# Patient Record
Sex: FEMALE | Race: BLACK OR AFRICAN AMERICAN | Employment: UNEMPLOYED | URBAN - METROPOLITAN AREA
[De-identification: names, ages, dates, MRNs, and addresses within clinical notes are randomized per-mention and may not be internally consistent; named-entity substitution may affect disease eponyms.]

---

## 2017-02-01 ENCOUNTER — HOSPITAL ENCOUNTER (EMERGENCY)
Age: 57
Discharge: HOME | End: 2017-02-01
Attending: EMERGENCY MEDICINE
Payer: SELF-PAY

## 2017-02-01 VITALS
HEART RATE: 70 BPM | RESPIRATION RATE: 16 BRPM | WEIGHT: 135 LBS | SYSTOLIC BLOOD PRESSURE: 145 MMHG | DIASTOLIC BLOOD PRESSURE: 84 MMHG | TEMPERATURE: 97.8 F | HEIGHT: 62 IN | OXYGEN SATURATION: 100 % | BODY MASS INDEX: 24.84 KG/M2
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
## 2017-02-01 PROCEDURE — 99283 EMERGENCY DEPT VISIT LOW MDM: CPT | Performed by: PHYSICIAN ASSISTANT

## 2017-02-02 NOTE — ED PROVIDER NOTES
HPI Comments: 81-year-old -American female presents stating that 2 days ago she was driving her car and a bug flew in and her chest wall area. Patient states now she has a red raised welt to the center of her chest that is pruritic. She has a prescription for Hydrocortisone cream from another irritation she had. Denies chills, fever, Nausea or vomiting. Patient is a 49 y.o. female presenting with Insect Bite. The history is provided by the patient. Insect Bite   This is a new problem. The current episode started 2 days ago. The problem occurs constantly. The problem has not changed since onset. Pertinent negatives include no chest pain, no abdominal pain, no headaches and no shortness of breath. Nothing aggravates the symptoms. Nothing relieves the symptoms. She has tried nothing for the symptoms. Past Medical History:   Diagnosis Date    Arthritis     Autoimmune disease ([address]      lupus    [facility] thrombosis) ([facility])      to R LE    Esophageal stricture     Gastroesophageal reflux disease     Hypertension     Other ill-defined conditions(799.89)      lupus       Past Surgical History:   Procedure Laterality Date    Hx gi       esopheagus stretched x2         No family history on file. Social History     Social History    Marital status:      Spouse name: N/A    Number of children: N/A    Years of education: N/A     Occupational History    Not on file. Social History Main Topics    Smoking status: Never Smoker    Smokeless tobacco: Never Used    Alcohol use No    Drug use: No    Sexual activity: Yes     Partners: Male     Birth control/ protection: None     Other Topics Concern    Not on file     Social History Narrative         ALLERGIES: Sulfa (sulfonamide antibiotics)    Review of Systems   Constitutional: Negative for chills and fever. HENT: Negative for sore throat, trouble swallowing and voice change. Respiratory: Negative for shortness of breath. Cardiovascular: Negative for chest pain. Gastrointestinal: Negative for abdominal pain, nausea and vomiting. Skin: Positive for rash. Neurological: Negative for headaches. Vitals:    02/01/17 1848   BP: 145/84   Pulse: 71   Resp: 16   Temp: 97.8 °F (36.6 °C)   SpO2: 99%   Weight: 61.2 kg (135 lb)   Height: 5' 2\" (1.575 m)            Physical Exam   Constitutional: She is oriented to person, place, and time. Vital signs are normal. She appears well-developed and well-nourished. She is active. Non-toxic appearance. She does not appear ill. No distress. Cardiovascular: Normal rate, regular rhythm and normal heart sounds. Exam reveals no gallop and no friction rub. No murmur heard. Pulmonary/Chest: Effort normal and breath sounds normal. No accessory muscle usage. No respiratory distress. She has no decreased breath sounds. She has no wheezes. She has no rhonchi. Neurological: She is alert and oriented to person, place, and time. Skin: Skin is warm and dry. Rash noted. Rash is urticarial. There is erythema. Psychiatric: She has a normal mood and affect. Her speech is normal and behavior is normal.   Nursing note and vitals reviewed. MDM  Number of Diagnoses or Management Options  Insect bite, initial encounter: new and does not require workup  Diagnosis management comments: Patient reassured that this area does not appear to be infected or serious. She is advised to use the hydrocortisone cream that she had 3 previous problem sparingly twice a day. She will also use Benadryl as directed for itching or irritation.     Risk of Complications, Morbidity, and/or Mortality  Presenting problems: minimal  Management options: minimal    Patient Progress  Patient progress: stable    ED Course       Procedures

## 2017-02-02 NOTE — ED NOTES
I have reviewed medications, follow up provider options, and discharge instructions with the patient. The patient verbalized understanding. Copy of discharge information given to patient upon discharge. Patient discharged in no distress. Patient instructed not to drive while under the influence of drowsy drugs. Patient ambulatory to waiting area.  No questions at this time

## 2017-02-02 NOTE — DISCHARGE INSTRUCTIONS
Apply Hydrocortisone Cream to affected area as directed. May use Benadryl Allergy 25 mg. Over the counter. May take (2) tablets every 6-8 hours as needed for itching/irritation. Insect Stings and Bites: Care Instructions  Your Care Instructions  Stings and bites from bees, wasps, ants, and other insects often cause pain, swelling, redness, and itching. In some people, especially children, the redness and swelling may be worse. It may extend several inches beyond the affected area. But in most cases, stings and bites don't cause reactions all over the body. If you have had a reaction to an insect sting or bite, you are at risk for a reaction if you get stung or bitten again. Follow-up care is a key part of your treatment and safety. Be sure to make and go to all appointments, and call your doctor if you are having problems. It's also a good idea to know your test results and keep a list of the medicines you take. How can you care for yourself at home? · Do not scratch or rub the skin where the sting or bite occurred. · Put a cold pack or ice cube on the area. Put a thin cloth between the ice and your skin. For some people, a paste of baking soda mixed with a little water helps relieve pain and decrease the reaction. · Take an over-the-counter antihistamine, such as diphenhydramine (Benadryl) or loratadine (Claritin), to relieve swelling, redness, and itching. Calamine lotion or hydrocortisone cream may also help. Do not give antihistamines to your child unless you have checked with the doctor first.  · Be safe with medicines. If your doctor prescribed medicine for your allergy, take it exactly as prescribed. Call your doctor if you think you are having a problem with your medicine. You will get more details on the specific medicines your doctor prescribes. · Your doctor may prescribe a shot of epinephrine to carry with you in case you have a severe reaction.  Learn how and when to give yourself the shot, and keep it with you at all times. Make sure it has not . · Go to the emergency room anytime you have a severe reaction. Go even if you have given yourself epinephrine and are feeling better. Symptoms can come back. When should you call for help? Call 911 anytime you think you may need emergency care. For example, call if:  · You have symptoms of a severe allergic reaction. These may include:  ¨ Sudden raised, red areas (hives) all over your body. ¨ Swelling of the throat, mouth, lips, or tongue. ¨ Trouble breathing. ¨ Passing out (losing consciousness). Or you may feel very lightheaded or suddenly feel weak, confused, or restless. Call your doctor now or seek immediate medical care if:  · You have symptoms of an allergic reaction not right at the sting or bite, such as:  ¨ A rash or small area of hives (raised, red areas on the skin). ¨ Itching. ¨ Swelling. ¨ Belly pain, nausea, or vomiting. · You have a lot of swelling around the site (such as your entire arm or leg is swollen). · You have signs of infection, such as:  ¨ Increased pain, swelling, redness, or warmth around the sting. ¨ Red streaks leading from the area. ¨ Pus draining from the sting. ¨ A fever. Watch closely for changes in your health, and be sure to contact your doctor if:  · You do not get better as expected. Where can you learn more? Go to http://lico-brandie.info/. Enter P390 in the search box to learn more about \"Insect Stings and Bites: Care Instructions. \"  Current as of: 2016  Content Version: 11.1  © 5613-6631 [employer]. Care instructions adapted under license by [employer] (which disclaims liability or warranty for this information). If you have questions about a medical condition or this instruction, always ask your healthcare professional. [place] [address] any warranty or liability for your use of this information.

## 2017-02-17 ENCOUNTER — HOSPITAL ENCOUNTER (EMERGENCY)
Age: 57
Discharge: HOME | End: 2017-02-17
Attending: EMERGENCY MEDICINE
Payer: SELF-PAY

## 2017-02-17 VITALS
SYSTOLIC BLOOD PRESSURE: 111 MMHG | HEART RATE: 65 BPM | WEIGHT: 135 LBS | BODY MASS INDEX: 24.84 KG/M2 | TEMPERATURE: 98.1 F | DIASTOLIC BLOOD PRESSURE: 70 MMHG | RESPIRATION RATE: 16 BRPM | HEIGHT: 62 IN | OXYGEN SATURATION: 100 %

## 2017-02-17 DIAGNOSIS — T14.8XXA MUSCLE STRAIN: Primary | ICD-10-CM

## 2017-02-17 LAB
BACTERIA URNS QL MICRO: 0 /HPF
CASTS URNS QL MICRO: NORMAL /LPF
CRYSTALS URNS QL MICRO: 0 /LPF
EPI CELLS #/AREA URNS HPF: NORMAL /HPF
MUCOUS THREADS URNS QL MICRO: 0 /LPF
RBC #/AREA URNS HPF: NORMAL /HPF
WBC URNS QL MICRO: NORMAL /HPF

## 2017-02-17 PROCEDURE — 81015 MICROSCOPIC EXAM OF URINE: CPT | Performed by: EMERGENCY MEDICINE

## 2017-02-17 PROCEDURE — 81003 URINALYSIS AUTO W/O SCOPE: CPT | Performed by: NURSE PRACTITIONER

## 2017-02-17 PROCEDURE — 87086 URINE CULTURE/COLONY COUNT: CPT | Performed by: NURSE PRACTITIONER

## 2017-02-17 PROCEDURE — 99284 EMERGENCY DEPT VISIT MOD MDM: CPT | Performed by: NURSE PRACTITIONER

## 2017-02-17 RX ORDER — METHOCARBAMOL 750 MG/1
750 TABLET, FILM COATED ORAL 4 TIMES DAILY
Qty: 12 TAB | Refills: 0 | Status: SHIPPED | OUTPATIENT
Start: 2017-02-17 | End: 2017-02-20

## 2017-02-17 NOTE — ED TRIAGE NOTES
Per patient body aches that started today at 1300 patient denies fever and chills, denies cough, patient states she has no taste, and post nasal drainage.

## 2017-02-17 NOTE — ED PROVIDER NOTES
HPI Comments: Patient states generalized body aches and mid back pain that started today. She states she was at work when symptoms started. She denies urinary problems, cough, congestion, and fever. Patient is a 49 y.o. female presenting with general illness. The history is provided by the patient. Generalized Body Aches   This is a new problem. The current episode started 12 to 24 hours ago. The problem occurs constantly. The problem has been gradually worsening. Pertinent negatives include no chest pain, no abdominal pain, no headaches and no shortness of breath. Nothing aggravates the symptoms. Nothing relieves the symptoms. Past Medical History:   Diagnosis Date    Arthritis     Autoimmune disease ([facility] [address]      lupus    DVT (deep venous thrombosis) ([facility])      to R LE    Esophageal stricture     Gastroesophageal reflux disease     Hypertension     Other ill-defined conditions(799.89)      lupus       Past Surgical History:   Procedure Laterality Date    Hx gi       esopheagus stretched x2         History reviewed. No pertinent family history. Social History     Social History    Marital status:      Spouse name: N/A    Number of children: N/A    Years of education: N/A     Occupational History    Not on file. Social History Main Topics    Smoking status: Never Smoker    Smokeless tobacco: Never Used    Alcohol use No    Drug use: No    Sexual activity: Yes     Partners: Male     Birth control/ protection: None     Other Topics Concern    Not on file     Social History Narrative         ALLERGIES: Sulfa (sulfonamide antibiotics)    Review of Systems   Constitutional: Negative for chills, fatigue and fever. HENT: Negative for congestion. Respiratory: Negative for shortness of breath. Cardiovascular: Negative for chest pain and leg swelling. Gastrointestinal: Negative for abdominal pain. Genitourinary: Negative for difficulty urinating.    Musculoskeletal: Positive for back pain and myalgias. Skin: Negative for color change. Neurological: Negative for dizziness, weakness and headaches. Psychiatric/Behavioral: Negative for confusion. Vitals:    02/17/17 1647   BP: 115/66   Pulse: 82   Resp: 18   Temp: 98.1 °F (36.7 °C)   SpO2: 96%   Weight: 61.2 kg (135 lb)   Height: 5' 2\" (1.575 m)            Physical Exam   Constitutional: She is oriented to person, place, and time. She appears well-developed and well-nourished. No distress. HENT:   Head: Normocephalic and atraumatic. Eyes: Conjunctivae are normal. Pupils are equal, round, and reactive to light. Neck: Normal range of motion. Neck supple. Cardiovascular: Normal rate, regular rhythm, normal heart sounds and intact distal pulses. No murmur heard. Pulmonary/Chest: Effort normal and breath sounds normal. No respiratory distress. She has no wheezes. Abdominal: Soft. Bowel sounds are normal. She exhibits no distension. Musculoskeletal:        Cervical back: Normal.        Thoracic back: She exhibits no swelling, no edema, no pain and normal pulse. Lumbar back: She exhibits tenderness and pain. Back:    Neurological: She is alert and oriented to person, place, and time. Skin: Skin is warm and dry. She is not diaphoretic. Psychiatric: She has a normal mood and affect. Her behavior is normal.   Nursing note and vitals reviewed. 6:39 PM-spoke with lab regarding urine specimen. They have sample. Recent Results (from the past 12 hour(s))   URINE MICROSCOPIC    Collection Time: 02/17/17  6:10 PM   Result Value Ref Range    WBC 0-3 0 /hpf    RBC 3-5 0 /hpf    Epithelial cells 3-5 0 /hpf    Bacteria 0 0 /hpf    Casts 5-10 0 /lpf    Crystals 0 0 /LPF    Mucus 0 0 /lpf       MDM  Number of Diagnoses or Management Options  Muscle strain: new and does not require workup  Diagnosis management comments: Patient with blood in her urine. Urine sent for culture.  Patient denies all urinary problems. She states pain has improved since she has increased her fluid intake while in the department. Prescription for robaxin given for at home use. We discussed to return to ED for urinary retention, pain with urination, urinary frequency, or any other concerns.         Amount and/or Complexity of Data Reviewed  Clinical lab tests: ordered and reviewed    Patient Progress  Patient progress: stable    ED Course       Procedures

## 2017-02-18 NOTE — ED NOTES
I have reviewed medications, follow up provider options, and discharge instructions with the patient. The patient verbalized understanding. Copy of discharge information given to patient upon discharge. Prescription(s) given to patient. Patient discharged in no distress. Patient instructed not to drive while under influence of drowsy drugs. Patient ambulatory to waiting area.  No questions at this time

## 2017-02-20 LAB
BACTERIA SPEC CULT: NORMAL
SERVICE CMNT-IMP: NORMAL

## 2017-03-28 ENCOUNTER — HOSPITAL ENCOUNTER (EMERGENCY)
Age: 57
Discharge: HOME | End: 2017-03-28
Attending: EMERGENCY MEDICINE
Payer: SELF-PAY

## 2017-03-28 VITALS
HEIGHT: 62 IN | RESPIRATION RATE: 18 BRPM | BODY MASS INDEX: 25.21 KG/M2 | DIASTOLIC BLOOD PRESSURE: 74 MMHG | TEMPERATURE: 98 F | OXYGEN SATURATION: 100 % | HEART RATE: 57 BPM | WEIGHT: 137 LBS | SYSTOLIC BLOOD PRESSURE: 129 MMHG

## 2017-03-28 DIAGNOSIS — S99.921A FOOT INJURY, RIGHT, INITIAL ENCOUNTER: Primary | ICD-10-CM

## 2017-03-28 PROCEDURE — 99283 EMERGENCY DEPT VISIT LOW MDM: CPT | Performed by: PHYSICIAN ASSISTANT

## 2017-03-29 NOTE — ED NOTES
I have reviewed discharge instructions with the patient. The patient verbalized understanding regarding discharge instructions. The patient exited the facility in an ambulatory fashion with discharge instructions in hand. The patient was in no acute distress upon exiting this facility.

## 2017-03-29 NOTE — ED PROVIDER NOTES
HPI Comments: 79-year-old -American female presents stating that she got out of her car in her driveway and had sneakers on and stepped on a nail with a flat head that went through the sole of her sneaker but did not puncture her right foot. Patient states the nail did bruise the bottom of her foot however. Patient is a 49 y.o. female presenting with foot injury. The history is provided by the patient. Foot Injury    This is a new problem. The current episode started 3 to 5 hours ago. The problem occurs constantly. The problem has not changed since onset. The pain is present in the right foot. The quality of the pain is described as dull. The pain is at a severity of 2/10. The pain is mild. Pertinent negatives include no numbness, full range of motion, no stiffness and no tingling. Exacerbated by: NOTHING. She has tried nothing for the symptoms. There has been a history of trauma. Past Medical History:   Diagnosis Date    Arthritis     Autoimmune disease ([facility] [address]     lupus    DVT (deep venous thrombosis) ([facility])     to R LE    Esophageal stricture     Gastroesophageal reflux disease     Hypertension     Other ill-defined conditions(799.89)     lupus       Past Surgical History:   Procedure Laterality Date    HX GI      esopheagus stretched x2         History reviewed. No pertinent family history. Social History     Social History    Marital status:      Spouse name: N/A    Number of children: N/A    Years of education: N/A     Occupational History    Not on file. Social History Main Topics    Smoking status: Never Smoker    Smokeless tobacco: Never Used    Alcohol use No    Drug use: No    Sexual activity: Yes     Partners: Male     Birth control/ protection: None     Other Topics Concern    Not on file     Social History Narrative         ALLERGIES: Sulfa (sulfonamide antibiotics)    Review of Systems   Constitutional: Negative for chills and fever.    Musculoskeletal: Negative for arthralgias, gait problem and stiffness. Skin: Positive for wound. Neurological: Negative for tingling and numbness. Vitals:    03/28/17 1937   BP: 151/86   Pulse: 72   Resp: 18   Temp: 97.2 °F (36.2 °C)   SpO2: 100%   Weight: 62.1 kg (137 lb)   Height: 5' 2\" (1.575 m)            Physical Exam   Constitutional: She is oriented to person, place, and time. Vital signs are normal. She appears well-developed and well-nourished. She is active. Non-toxic appearance. She does not appear ill. No distress. Patient is ambulatory without difficulty. Cardiovascular:   Pulses:       Dorsalis pedis pulses are 2+ on the right side        Posterior tibial pulses are 2+ on the right side   Musculoskeletal:        Right foot: There is tenderness. There is normal range of motion, no bony tenderness, no swelling, normal capillary refill, no crepitus and no deformity. Feet:    Neurological: She is alert and oriented to person, place, and time. Skin: Skin is warm and dry. Psychiatric: She has a normal mood and affect. Her behavior is normal.   Nursing note and vitals reviewed. MDM  Number of Diagnoses or Management Options  Foot injury, right, initial encounter: new and does not require workup  Diagnosis management comments: Patient is on Coumadin and I advised her to watch for any expanding ecchymosis. I advised her if this occurs she should return to the ER. She states that she does have an appointment with the [facility] tomorrow.     Risk of Complications, Morbidity, and/or Mortality  Presenting problems: low  Management options: minimal    Patient Progress  Patient progress: stable    ED Course       Procedures

## 2017-03-29 NOTE — DISCHARGE INSTRUCTIONS
Keep right foot elevated with ice packs applied as tolerated. There is no puncture or broken skin but observe this area for increased bruising due to your Coumadin use. May use Tylenol as directed. Bruises: Care Instructions  Your Care Instructions    Bruises occur when small blood vessels under the skin tear or rupture, most often from a twist, bump, or fall. Blood leaks into tissues under the skin and causes a black-and-blue spot that often turns colors, including purplish black, reddish blue, or yellowish green, as the bruise heals. Bruises hurt, but most are not serious and will go away on their own within 2 to 4 weeks. Sometimes, gravity causes them to spread down the body. A leg bruise usually will take longer to heal than a bruise on the face or arms. Follow-up care is a key part of your treatment and safety. Be sure to make and go to all appointments, and call your doctor if you are having problems. Its also a good idea to know your test results and keep a list of the medicines you take. How can you care for yourself at home? · Take pain medicines exactly as directed. ¨ If the doctor gave you a prescription medicine for pain, take it as prescribed. ¨ If you are not taking a prescription pain medicine, ask your doctor if you can take an over-the-counter medicine. · Put ice or a cold pack on the area for 10 to 20 minutes at a time. Put a thin cloth between the ice and your skin. · If you can, prop up the bruised area on pillows as much as possible for the next few days. Try to keep the bruise above the level of your heart. When should you call for help? Call your doctor now or seek immediate medical care if:  · You have signs of infection, such as:  ¨ Increased pain, swelling, warmth, or redness. ¨ Red streaks leading from the bruise. ¨ Pus draining from the bruise. ¨ A fever.   · You have a bruise on your leg and signs of a blood clot, such as:  ¨ Increasing redness and swelling along with warmth, tenderness, and pain in the bruised area. ¨ Pain in your calf, back of the knee, thigh, or groin. ¨ Redness and swelling in your leg or groin. · Your pain gets worse. Watch closely for changes in your health, and be sure to contact your doctor if:  · You do not get better as expected. Where can you learn more? Go to http://lico-brandie.info/. Enter ([phone] in the search box to learn more about \"Bruises: Care Instructions. \"  Current as of: May 27, 2016  Content Version: 11.2  © 1133-1169 [employer]. Care instructions adapted under license by [employer] (which disclaims liability or warranty for this information). If you have questions about a medical condition or this instruction, always ask your healthcare professional. [address] any warranty or liability for your use of this information.

## 2018-03-12 PROCEDURE — 99284 EMERGENCY DEPT VISIT MOD MDM: CPT | Performed by: EMERGENCY MEDICINE

## 2018-03-13 ENCOUNTER — HOSPITAL ENCOUNTER (EMERGENCY)
Age: 58
Discharge: HOME | End: 2018-03-13
Attending: EMERGENCY MEDICINE
Payer: SELF-PAY

## 2018-03-13 ENCOUNTER — APPOINTMENT (OUTPATIENT)
Dept: GENERAL RADIOLOGY | Age: 58
End: 2018-03-13
Attending: EMERGENCY MEDICINE
Payer: SELF-PAY

## 2018-03-13 VITALS
DIASTOLIC BLOOD PRESSURE: 92 MMHG | SYSTOLIC BLOOD PRESSURE: 172 MMHG | HEIGHT: 62 IN | HEART RATE: 66 BPM | BODY MASS INDEX: 25.21 KG/M2 | RESPIRATION RATE: 20 BRPM | OXYGEN SATURATION: 100 % | TEMPERATURE: 98.6 F | WEIGHT: 137 LBS

## 2018-03-13 DIAGNOSIS — R07.89 ATYPICAL CHEST PAIN: Primary | ICD-10-CM

## 2018-03-13 LAB
ALBUMIN SERPL-MCNC: 3.7 G/DL (ref 3.5–5)
ALBUMIN/GLOB SERPL: 0.8 {RATIO} (ref 1.2–3.5)
ALP SERPL-CCNC: 64 U/L (ref 50–136)
ALT SERPL-CCNC: 32 U/L (ref 12–65)
ANION GAP SERPL CALC-SCNC: 11 MMOL/L (ref 7–16)
AST SERPL-CCNC: 41 U/L (ref 15–37)
ATRIAL RATE: 75 BPM
BASOPHILS # BLD: 0 K/UL (ref 0–0.2)
BASOPHILS NFR BLD: 0 % (ref 0–2)
BILIRUB SERPL-MCNC: 0.5 MG/DL (ref 0.2–1.1)
BUN SERPL-MCNC: 11 MG/DL (ref 6–23)
CALCIUM SERPL-MCNC: 8.9 MG/DL (ref 8.3–10.4)
CALCULATED P AXIS, ECG09: 72 DEGREES
CALCULATED R AXIS, ECG10: 75 DEGREES
CALCULATED T AXIS, ECG11: 46 DEGREES
CHLORIDE SERPL-SCNC: 102 MMOL/L (ref 98–107)
CO2 SERPL-SCNC: 23 MMOL/L (ref 21–32)
CREAT SERPL-MCNC: 0.8 MG/DL (ref 0.6–1)
DIAGNOSIS, 93000: NORMAL
DIFFERENTIAL METHOD BLD: NORMAL
EOSINOPHIL # BLD: 0.1 K/UL (ref 0–0.8)
EOSINOPHIL NFR BLD: 1 % (ref 0.5–7.8)
ERYTHROCYTE [DISTWIDTH] IN BLOOD BY AUTOMATED COUNT: 12.4 % (ref 11.9–14.6)
GLOBULIN SER CALC-MCNC: 4.7 G/DL (ref 2.3–3.5)
GLUCOSE SERPL-MCNC: 92 MG/DL (ref 65–100)
HCT VFR BLD AUTO: 39.3 % (ref 35.8–46.3)
HGB BLD-MCNC: 13.4 G/DL (ref 11.7–15.4)
IMM GRANULOCYTES # BLD: 0 K/UL (ref 0–0.5)
IMM GRANULOCYTES NFR BLD AUTO: 0 % (ref 0–5)
LYMPHOCYTES # BLD: 1.8 K/UL (ref 0.5–4.6)
LYMPHOCYTES NFR BLD: 37 % (ref 13–44)
MCH RBC QN AUTO: 30.9 PG (ref 26.1–32.9)
MCHC RBC AUTO-ENTMCNC: 34.1 G/DL (ref 31.4–35)
MCV RBC AUTO: 90.8 FL (ref 79.6–97.8)
MONOCYTES # BLD: 0.5 K/UL (ref 0.1–1.3)
MONOCYTES NFR BLD: 9 % (ref 4–12)
NEUTS SEG # BLD: 2.6 K/UL (ref 1.7–8.2)
NEUTS SEG NFR BLD: 53 % (ref 43–78)
P-R INTERVAL, ECG05: 138 MS
PLATELET # BLD AUTO: 176 K/UL (ref 150–450)
PMV BLD AUTO: 11.3 FL (ref 10.8–14.1)
POTASSIUM SERPL-SCNC: 3.4 MMOL/L (ref 3.5–5.1)
PROT SERPL-MCNC: 8.4 G/DL (ref 6.3–8.2)
Q-T INTERVAL, ECG07: 402 MS
QRS DURATION, ECG06: 80 MS
QTC CALCULATION (BEZET), ECG08: 448 MS
RBC # BLD AUTO: 4.33 M/UL (ref 4.05–5.25)
SODIUM SERPL-SCNC: 136 MMOL/L (ref 136–145)
TROPONIN I BLD-MCNC: 0.01 NG/ML (ref 0.02–0.05)
TROPONIN I SERPL-MCNC: <0.02 NG/ML (ref 0.02–0.05)
VENTRICULAR RATE, ECG03: 75 BPM
WBC # BLD AUTO: 4.9 K/UL (ref 4.3–11.1)

## 2018-03-13 PROCEDURE — 71046 X-RAY EXAM CHEST 2 VIEWS: CPT

## 2018-03-13 PROCEDURE — 85025 COMPLETE CBC W/AUTO DIFF WBC: CPT | Performed by: EMERGENCY MEDICINE

## 2018-03-13 PROCEDURE — 84484 ASSAY OF TROPONIN QUANT: CPT | Performed by: EMERGENCY MEDICINE

## 2018-03-13 PROCEDURE — 80053 COMPREHEN METABOLIC PANEL: CPT | Performed by: EMERGENCY MEDICINE

## 2018-03-13 PROCEDURE — 93005 ELECTROCARDIOGRAM TRACING: CPT | Performed by: EMERGENCY MEDICINE

## 2018-03-13 NOTE — DISCHARGE INSTRUCTIONS
Return with any fevers, vomiting, difficulty breathing, worsening symptoms, or additional concerns. Follow up for your colonoscopy as planned.

## 2018-03-13 NOTE — ED NOTES
I have reviewed discharge instructions with the patient. The patient verbalized understanding. Patient left ED via Discharge Method: ambulatory to Home with self. Opportunity for questions and clarification provided. Patient given 0 scripts. To continue your aftercare when you leave the hospital, you may receive an automated call from our care team to check in on how you are doing. This is a free service and part of our promise to provide the best care and service to meet your aftercare needs.  If you have questions, or wish to unsubscribe from this service please call [phone]. [facility].

## 2018-03-13 NOTE — ED TRIAGE NOTES
Pt arrives to ED with c/o chest pain and sob. States started tonight. States having diarrhea and weakness also. States uses warfarin for a fib. States been off of it for one week due to scheduled colonoscopy.

## 2018-05-06 ENCOUNTER — HOSPITAL ENCOUNTER (EMERGENCY)
Age: 58
Discharge: HOME | End: 2018-05-06
Attending: EMERGENCY MEDICINE
Payer: SELF-PAY

## 2018-05-06 VITALS
TEMPERATURE: 98 F | WEIGHT: 135 LBS | HEART RATE: 61 BPM | OXYGEN SATURATION: 100 % | RESPIRATION RATE: 16 BRPM | BODY MASS INDEX: 24.84 KG/M2 | DIASTOLIC BLOOD PRESSURE: 98 MMHG | HEIGHT: 62 IN | SYSTOLIC BLOOD PRESSURE: 172 MMHG

## 2018-05-06 VITALS
TEMPERATURE: 98.2 F | HEART RATE: 61 BPM | DIASTOLIC BLOOD PRESSURE: 79 MMHG | BODY MASS INDEX: 24.84 KG/M2 | RESPIRATION RATE: 18 BRPM | WEIGHT: 135 LBS | OXYGEN SATURATION: 100 % | SYSTOLIC BLOOD PRESSURE: 143 MMHG | HEIGHT: 62 IN

## 2018-05-06 DIAGNOSIS — R10.84 ABDOMINAL PAIN, GENERALIZED: ICD-10-CM

## 2018-05-06 DIAGNOSIS — M54.2 NECK PAIN: ICD-10-CM
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
## 2018-05-06 DIAGNOSIS — R31.0 GROSS HEMATURIA: Primary | ICD-10-CM

## 2018-05-06 LAB
BACTERIA URNS QL MICRO: 0 /HPF
CASTS URNS QL MICRO: NORMAL /LPF
EPI CELLS #/AREA URNS HPF: NORMAL /HPF
RBC #/AREA URNS HPF: NORMAL /HPF
WBC URNS QL MICRO: NORMAL /HPF

## 2018-05-06 PROCEDURE — 81015 MICROSCOPIC EXAM OF URINE: CPT | Performed by: EMERGENCY MEDICINE

## 2018-05-06 PROCEDURE — 99283 EMERGENCY DEPT VISIT LOW MDM: CPT | Performed by: EMERGENCY MEDICINE

## 2018-05-06 PROCEDURE — 81003 URINALYSIS AUTO W/O SCOPE: CPT | Performed by: EMERGENCY MEDICINE

## 2018-05-06 RX ORDER — CYCLOBENZAPRINE HCL 5 MG
10 TABLET ORAL
Qty: 20 TAB | Refills: 0 | Status: SHIPPED | OUTPATIENT
Start: 2018-05-06 | End: 2019-09-17
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
## 2018-05-06 NOTE — ED NOTES
I have reviewed discharge instructions with the patient. The patient verbalized understanding. Patient left ED via Discharge Method: ambulatory to Home. Opportunity for questions and clarification provided. Patient given 0 scripts. To continue your aftercare when you leave the hospital, you may receive an automated call from our care team to check in on how you are doing. This is a free service and part of our promise to provide the best care and service to meet your aftercare needs.  If you have questions, or wish to unsubscribe from this service please call [phone]. Thank you for Choosing our [facility] Emergency Department.

## 2018-05-06 NOTE — ED NOTES
I have reviewed discharge instructions with the patient. The patient verbalized understanding. Patient left ED via Discharge Method: ambulatory to Home with family. Opportunity for questions and clarification provided. Patient given 1 scripts. To continue your aftercare when you leave the hospital, you may receive an automated call from our care team to check in on how you are doing. This is a free service and part of our promise to provide the best care and service to meet your aftercare needs.  If you have questions, or wish to unsubscribe from this service please call [phone]. Thank you for Choosing our [facility] Emergency Department.

## 2018-05-06 NOTE — DISCHARGE INSTRUCTIONS
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
## 2018-06-15 ENCOUNTER — HOSPITAL ENCOUNTER (EMERGENCY)
Age: 58
Discharge: HOME | End: 2018-06-16
Attending: EMERGENCY MEDICINE
Payer: MEDICAID

## 2018-06-15 DIAGNOSIS — S16.1XXA NECK STRAIN, INITIAL ENCOUNTER: Primary | ICD-10-CM

## 2018-06-15 PROCEDURE — 99283 EMERGENCY DEPT VISIT LOW MDM: CPT | Performed by: EMERGENCY MEDICINE

## 2018-06-15 RX ORDER — METHOCARBAMOL 750 MG/1
750 TABLET, FILM COATED ORAL 4 TIMES DAILY
Qty: 15 TAB | Refills: 0 | Status: SHIPPED | OUTPATIENT
Start: 2018-06-15 | End: 2018-06-19

## 2018-06-16 VITALS
WEIGHT: 132 LBS | RESPIRATION RATE: 18 BRPM | SYSTOLIC BLOOD PRESSURE: 143 MMHG | OXYGEN SATURATION: 100 % | TEMPERATURE: 98.2 F | DIASTOLIC BLOOD PRESSURE: 87 MMHG | HEIGHT: 63 IN | HEART RATE: 60 BPM | BODY MASS INDEX: 23.39 KG/M2

## 2018-06-16 NOTE — ED NOTES
I have reviewed discharge instructions with the patient. The patient verbalized understanding. Patient left ED via Discharge Method: stretcher to Nursing Home with self. Opportunity for questions and clarification provided. Patient given 1 scripts. To continue your aftercare when you leave the hospital, you may receive an automated call from our care team to check in on how you are doing. This is a free service and part of our promise to provide the best care and service to meet your aftercare needs.  If you have questions, or wish to unsubscribe from this service please call [phone]. Thank you for Choosing our [facility] Emergency Department.

## 2018-06-16 NOTE — DISCHARGE INSTRUCTIONS
Neck Strain: Care Instructions  Your Care Instructions    You have strained the muscles and ligaments in your neck. A sudden, awkward movement can strain the neck. This often occurs with falls or car accidents or during certain sports. Everyday activities like working on a computer or sleeping can also cause neck strain if they force you to hold your neck in an awkward position for a long time. It is common for neck pain to get worse for a day or two after an injury, but it should start to feel better after that. You may have more pain and stiffness for several days before it gets better. This is expected. It may take a few weeks or longer for it to heal completely. Good home treatment can help you get better faster and avoid future neck problems. Follow-up care is a key part of your treatment and safety. Be sure to make and go to all appointments, and call your doctor if you are having problems. It's also a good idea to know your test results and keep a list of the medicines you take. How can you care for yourself at home? · If you were given a neck brace (cervical collar) to limit neck motion, wear it as instructed for as many days as your doctor tells you to. Do not wear it longer than you were told to. Wearing a brace for too long can make neck stiffness worse and weaken the neck muscles. · You can try using heat or ice to see if it helps. ¨ Try using a heating pad on a low or medium setting for 15 to 20 minutes every 2 to 3 hours. Try a warm shower in place of one session with the heating pad. You can also buy single-use heat wraps that last up to 8 hours. ¨ You can also try an ice pack for 10 to 15 minutes every 2 to 3 hours. · Take pain medicines exactly as directed. ¨ If the doctor gave you a prescription medicine for pain, take it as prescribed. ¨ If you are not taking a prescription pain medicine, ask your doctor if you can take an over-the-counter medicine.   · Gently rub the area to relieve pain and help with blood flow. Do not massage the area if it hurts to do so. · Do not do anything that makes the pain worse. Take it easy for a couple of days. You can do your usual activities if they do not hurt your neck or put it at risk for more stress or injury. · Try sleeping on a special neck pillow. Place it under your neck, not under your head. Placing a tightly rolled-up towel under your neck while you sleep will also work. If you use a neck pillow or rolled towel, do not use your regular pillow at the same time. · To prevent future neck pain, do exercises to stretch and strengthen your neck and back. Learn how to use good posture, safe lifting techniques, and proper body mechanics. When should you call for help? Call 911 anytime you think you may need emergency care. For example, call if:  ? · You are unable to move an arm or a leg at all. ?Call your doctor now or seek immediate medical care if:  ? · You have new or worse symptoms in your arms, legs, chest, belly, or buttocks. Symptoms may include:  ¨ Numbness or tingling. ¨ Weakness. ¨ Pain. ? · You lose bladder or bowel control. ? Watch closely for changes in your health, and be sure to contact your doctor if:  ? · You are not getting better as expected. Where can you learn more? Go to http://lico-brandie.info/. Enter M253 in the search box to learn more about \"Neck Strain: Care Instructions. \"  Current as of: March 21, 2017  Content Version: 11.4  © 7639-4876 [employer]. Care instructions adapted under license by [employer] (which disclaims liability or warranty for this information). If you have questions about a medical condition or this instruction, always ask your healthcare professional. [address] any warranty or liability for your use of this information.

## 2018-06-16 NOTE — ED TRIAGE NOTES
C/o right sided neck pain radiating into right shoulder. Onset this am upon waking. States works at [employer] heavy objects. Denies known injury or trauma.  Attempted tylenol with some relief

## 2018-12-10 ENCOUNTER — APPOINTMENT (OUTPATIENT)
Dept: GENERAL RADIOLOGY | Age: 58
End: 2018-12-10
Attending: EMERGENCY MEDICINE
Payer: SELF-PAY

## 2018-12-10 ENCOUNTER — HOSPITAL ENCOUNTER (EMERGENCY)
Age: 58
Discharge: HOME | End: 2018-12-10
Attending: EMERGENCY MEDICINE
Payer: SELF-PAY

## 2018-12-10 VITALS
HEART RATE: 94 BPM | RESPIRATION RATE: 20 BRPM | SYSTOLIC BLOOD PRESSURE: 135 MMHG | TEMPERATURE: 98.4 F | HEIGHT: 63 IN | BODY MASS INDEX: 22.32 KG/M2 | OXYGEN SATURATION: 98 % | WEIGHT: 126 LBS | DIASTOLIC BLOOD PRESSURE: 78 MMHG

## 2018-12-10 DIAGNOSIS — J06.9 VIRAL UPPER RESPIRATORY INFECTION: Primary | ICD-10-CM

## 2018-12-10 PROCEDURE — 99283 EMERGENCY DEPT VISIT LOW MDM: CPT | Performed by: EMERGENCY MEDICINE

## 2018-12-10 PROCEDURE — 71046 X-RAY EXAM CHEST 2 VIEWS: CPT

## 2018-12-10 RX ORDER — ALBUTEROL SULFATE 90 UG/1
2 AEROSOL, METERED RESPIRATORY (INHALATION)
Qty: 1 INHALER | Refills: 0 | Status: SHIPPED | OUTPATIENT
Start: 2018-12-10

## 2018-12-10 RX ORDER — BENZONATATE 100 MG/1
100 CAPSULE ORAL
Qty: 30 CAP | Refills: 0 | Status: SHIPPED | OUTPATIENT
Start: 2018-12-10 | End: 2018-12-17

## 2018-12-10 RX ORDER — DEXAMETHASONE 4 MG/1
TABLET ORAL
Qty: 9 TAB | Refills: 0 | Status: SHIPPED | OUTPATIENT
Start: 2018-12-10

## 2018-12-10 NOTE — ED PROVIDER NOTES
Very pleasant 61-year-old female presented for cough. Symptoms started yesterday. She described it as ear and nasal congestion that progressed  To a tingling in the back of her throat and now feels like it is deeper into her chest.  She coughs and gets some sputum but mostly is dry. Skin worse at nighttime. She been taking over-the-counter cold and flu medications with minimal relief. She denies fevers or chills. She is not short of breath. She's had no nausea or vomiting. No recent sick contacts that she is aware of. She denies prior diagnoses of pneumonia. No new medications. Cough Past Medical History:  
Diagnosis Date  Arthritis  Autoimmune disease ([address] lupus  DVT (deep venous thrombosis) (HCC)   
 to R LE  
 Esophageal stricture  Gastroesophageal reflux disease  Hypertension  Other ill-defined conditions(799.89)   
 lupus Past Surgical History:  
Procedure Laterality Date  HX GI    
 esopheagus stretched x2 History reviewed. No pertinent family history. Social History Socioeconomic History  Marital status:  Spouse name: Not on file  Number of children: Not on file  Years of education: Not on file  Highest education level: Not on file Social Needs  Financial resource strain: Not on file  Food insecurity - worry: Not on file  Food insecurity - inability: Not on file  Transportation needs - medical: Not on file  Transportation needs - non-medical: Not on file Occupational History  Not on file Tobacco Use  Smoking status: Never Smoker  Smokeless tobacco: Never Used Substance and Sexual Activity  Alcohol use: No  
 Drug use: No  
 Sexual activity: Yes  
  Partners: Male Birth control/protection: None Other Topics Concern  Not on file Social History Narrative  Not on file ALLERGIES: Sulfa (sulfonamide antibiotics) Review of Systems Respiratory: Positive for cough. All other systems reviewed and are negative. Vitals:  
 12/10/18 2049 BP: 145/90 Pulse: 88 Resp: 17 Temp: 98.4 °F (36.9 °C) SpO2: 99% Weight: 57.2 kg (126 lb) Height: 5' 3\" (1.6 m) Physical Exam  
Constitutional: She is oriented to person, place, and time. She appears well-developed and well-nourished. HENT:  
Head: Normocephalic and atraumatic. Mouth/Throat: Oropharynx is clear and moist.  
ild postnasal drip Eyes: Conjunctivae are normal. Pupils are equal, round, and reactive to light. Neck: Neck supple. Cardiovascular: Normal rate and regular rhythm. Pulmonary/Chest: Effort normal and breath sounds normal.  
Abdominal: Soft. Bowel sounds are normal.  
Musculoskeletal: Normal range of motion. Neurological: She is alert and oriented to person, place, and time. Skin: Skin is warm and dry. Nursing note and vitals reviewed. MDM Number of Diagnoses or Management Options Diagnosis management comments: Pleasant 27-year-old presenting for upper respiratory infection and bronchitis type symptoms. Chest x-ray was performed from triage and is clear. Patient's vital signs are normal and I see no indication for antibiotics. Amount and/or Complexity of Data Reviewed Independent visualization of images, tracings, or specimens: yes (I see no acute process) Risk of Complications, Morbidity, and/or Mortality Presenting problems: low Diagnostic procedures: low Management options: low General comments: Viral URI, symptomatic treatment. Patient Progress Patient progress: stable Procedures

## 2018-12-10 NOTE — LETTER
[facility] EMERGENCY DEPT One [address] [address] 
[phone] Work/School Note Date: 12/10/2018 To Whom It May concern: 
 
Waldemar Sabillon was seen and treated today in the emergency room by the following provider(s): 
Attending Provider: Mehdi Kimble MD. Waldemar Sabillon may return to work on 12/12/2018. Sincerely, 
 
 
 
 
Bryce Garcia

## 2018-12-10 NOTE — ED NOTES
I have reviewed discharge instructions with the patient. The patient verbalized understanding. Patient left ED via Discharge Method: ambulatory to Home with (insert name of family/friend, self). Opportunity for questions and clarification provided. Patient given 3 scripts. To continue your aftercare when you leave the hospital, you may receive an automated call from our care team to check in on how you are doing. This is a free service and part of our promise to provide the best care and service to meet your aftercare needs.  If you have questions, or wish to unsubscribe from this service please call [phone]. Thank you for Choosing our [facility] Emergency Department.

## 2018-12-10 NOTE — ED TRIAGE NOTES
Pt states she is here for cough and phlegm in her chest.  States she is coughing up light brown mucus. States her ears are itchy and throat is sore.

## 2019-03-11 ENCOUNTER — HOSPITAL ENCOUNTER (EMERGENCY)
Age: 59
Discharge: HOME | End: 2019-03-11
Attending: EMERGENCY MEDICINE
Payer: SELF-PAY

## 2019-03-11 ENCOUNTER — APPOINTMENT (OUTPATIENT)
Dept: GENERAL RADIOLOGY | Age: 59
End: 2019-03-11
Attending: EMERGENCY MEDICINE
Payer: SELF-PAY

## 2019-03-11 VITALS
RESPIRATION RATE: 18 BRPM | BODY MASS INDEX: 22.32 KG/M2 | HEART RATE: 74 BPM | SYSTOLIC BLOOD PRESSURE: 157 MMHG | DIASTOLIC BLOOD PRESSURE: 87 MMHG | WEIGHT: 126 LBS | TEMPERATURE: 98.3 F | OXYGEN SATURATION: 98 %

## 2019-03-11 DIAGNOSIS — S40.021A TRAUMATIC HEMATOMA OF RIGHT UPPER ARM, INITIAL ENCOUNTER: Primary | ICD-10-CM

## 2019-03-11 LAB
INR BLD: 2.3 (ref 0.9–1.2)
PT BLD: 26.7 SECS (ref 9.6–11.6)

## 2019-03-11 PROCEDURE — 85610 PROTHROMBIN TIME: CPT

## 2019-03-11 PROCEDURE — 99283 EMERGENCY DEPT VISIT LOW MDM: CPT | Performed by: EMERGENCY MEDICINE

## 2019-03-11 PROCEDURE — 73060 X-RAY EXAM OF HUMERUS: CPT

## 2019-03-11 NOTE — DISCHARGE INSTRUCTIONS
May try Tylenol for discomfort. Cool compresses to light tomorrow. May change over to heat. Recheck 2 days if not improving or sooner if worse or signs of infection. Patient Education        Hematoma: Care Instructions  Your Care Instructions    A hematoma is a bad bruise. It happens when an injury causes blood to collect and pool under the skin. The pooling blood gives the skin a spongy, rubbery, lumpy feel. A hematoma usually is not a cause for concern. It is not the same thing as a blood clot in a vein, and it does not cause blood clots. Follow-up care is a key part of your treatment and safety. Be sure to make and go to all appointments, and call your doctor if you are having problems. It's also a good idea to know your test results and keep a list of the medicines you take. How can you care for yourself at home? · Rest and protect the bruised area. · Put ice or a cold pack on the area for 10 to 20 minutes at a time. · Prop up the bruised area on a pillow when you ice it or anytime you sit or lie down during the next 3 days. Try to keep it above the level of your heart. This will help reduce swelling. · Wrapping the bruised area with an elastic bandage such as an Ace wrap will help decrease swelling. Don't wrap it too tightly, as this can cause more swelling below the affected area. · Be safe with medicines. Read and follow all instructions on the label. ? If the doctor gave you a prescription medicine for pain, take it as prescribed. ? If you are not taking a prescription pain medicine, ask your doctor if you can take an over-the-counter medicine. · Do not take two or more pain medicines at the same time unless the doctor told you to. Many pain medicines have acetaminophen, which is Tylenol. Too much acetaminophen (Tylenol) can be harmful. When should you call for help?   Call your doctor now or seek immediate medical care if:    · You have signs of skin infection, such as:  ? Increased pain, swelling, warmth, or redness. ? Red streaks leading from the area. ? Pus draining from the area. ? A fever.    Watch closely for changes in your health, and be sure to contact your doctor if:    · The bruise lasts longer than 4 weeks.     · The bruise gets bigger or becomes more painful.     · You do not get better as expected. Where can you learn more? Go to http://lico-brandie.info/. Enter P911 in the search box to learn more about \"Hematoma: Care Instructions. \"  Current as of: September 23, 2018  Content Version: 11.9  © 0891-3956 [employer]. Care instructions adapted under license by [employer] (which disclaims liability or warranty for this information). If you have questions about a medical condition or this instruction, always ask your healthcare professional. [address] any warranty or liability for your use of this information.

## 2019-03-11 NOTE — LETTER
[facility] [place] [address] [id number] [phone] Work/School Note Date: 3/11/2019 To Whom It May concern: 
 
Reuben Martin was seen and treated today in the emergency room by the following provider(s): 
Attending Provider: Jonathan Olivares MD. Reuben Martin may return to work on 3/14. Sincerely, Faby Hooker MD

## 2019-03-11 NOTE — ED TRIAGE NOTES
Pt reports she bumped into something at home about a week ago and she has a knot in her right upper arm.

## 2019-03-11 NOTE — ED PROVIDER NOTES
61-year-old female is on Coumadin because of a history of DVT. Bumped her right arm a few days ago. 2 days ago was using it on for heavy lifting and pulling at work. Increasing pain the last 48 hours. She noticed a lump in her arm tonight. No numbness or weakness. The history is provided by the patient. Arm Pain    This is a new problem. The current episode started 2 days ago. The problem occurs constantly. The problem has been gradually worsening. The pain is present in the right arm. The quality of the pain is described as aching and dull. Pertinent negatives include no numbness, full range of motion and no neck pain. She has tried nothing for the symptoms. There has been a history of trauma. Past Medical History:   Diagnosis Date    Arthritis     Autoimmune disease ([facility] [address]     lupus    DVT (deep venous thrombosis) (HCC)     to R LE    Esophageal stricture     Gastroesophageal reflux disease     Hypertension     Other ill-defined conditions(799.89)     lupus       Past Surgical History:   Procedure Laterality Date    HX GI      esopheagus stretched x2         History reviewed. No pertinent family history.     Social History     Socioeconomic History    Marital status:      Spouse name: Not on file    Number of children: Not on file    Years of education: Not on file    Highest education level: Not on file   Social Needs    Financial resource strain: Not on file    Food insecurity - worry: Not on file    Food insecurity - inability: Not on file    Transportation needs - medical: Not on file   exoro system needs - non-medical: Not on file   Occupational History    Not on file   Tobacco Use    Smoking status: Never Smoker    Smokeless tobacco: Never Used   Substance and Sexual Activity    Alcohol use: No    Drug use: No    Sexual activity: Yes     Partners: Male     Birth control/protection: None   Other Topics Concern    Not on file   Social History Narrative    Not on file         ALLERGIES: Sulfa (sulfonamide antibiotics)    Review of Systems   Constitutional: Negative for chills and fever. Musculoskeletal: Negative for neck pain. Skin: Negative for color change and rash. Neurological: Negative for weakness and numbness. Vitals:    03/11/19 1814   BP: 175/81   Pulse: 86   Resp: 18   Temp: 98 °F (36.7 °C)   SpO2: 99%            Physical Exam   Constitutional: She appears well-developed and well-nourished. No distress. Musculoskeletal:        Right shoulder: Normal.        Right elbow: Normal.       Arms:  Skin: Skin is warm and dry. Nursing note and vitals reviewed. MDM  Number of Diagnoses or Management Options  Diagnosis management comments: Suspect hematoma. Do not believe DVT imaging needed. We'll check point of care INR. Amount and/or Complexity of Data Reviewed  Clinical lab tests: ordered and reviewed  Tests in the radiology section of CPT®: ordered and reviewed  Independent visualization of images, tracings, or specimens: yes    Risk of Complications, Morbidity, and/or Mortality  Presenting problems: moderate  Diagnostic procedures: minimal  Management options: low           Procedures      Xr Humerus Rt    Result Date: 3/11/2019  RIGHT HUMERUS 3 VIEWS HISTORY: Acute moderate right arm pain with palpable knot for one week. No known injury. FINDINGS: A BB was placed over the palpable abnormality. 3 views demonstrate no displaced fracture, malalignment or aggressive osseous lesion. Soft tissues are normal.     IMPRESSION: Unremarkable 3 views right humerus.

## 2019-03-12 NOTE — ED NOTES
I have reviewed discharge instructions with the patient. The patient verbalized understanding. Patient to follow up with PMD as referred and RTED with any changes/concerns. Patient expresses understanding. Patient from ED in NAD, no changes in med regimen.

## 2019-08-12 ENCOUNTER — HOSPITAL ENCOUNTER (EMERGENCY)
Age: 59
Discharge: HOME | End: 2019-08-12
Attending: EMERGENCY MEDICINE
Payer: SELF-PAY

## 2019-08-12 ENCOUNTER — APPOINTMENT (OUTPATIENT)
Dept: GENERAL RADIOLOGY | Age: 59
End: 2019-08-12
Attending: EMERGENCY MEDICINE
Payer: SELF-PAY

## 2019-08-12 VITALS
BODY MASS INDEX: 25.03 KG/M2 | RESPIRATION RATE: 18 BRPM | WEIGHT: 136 LBS | SYSTOLIC BLOOD PRESSURE: 136 MMHG | HEART RATE: 85 BPM | OXYGEN SATURATION: 100 % | DIASTOLIC BLOOD PRESSURE: 76 MMHG | HEIGHT: 62 IN | TEMPERATURE: 98.4 F

## 2019-08-12 DIAGNOSIS — K59.00 CONSTIPATION, UNSPECIFIED CONSTIPATION TYPE: ICD-10-CM

## 2019-08-12 DIAGNOSIS — B37.31 YEAST VAGINITIS: Primary | ICD-10-CM

## 2019-08-12 LAB
ALBUMIN SERPL-MCNC: 3.5 G/DL (ref 3.5–5)
ALBUMIN/GLOB SERPL: 0.8 {RATIO} (ref 1.2–3.5)
ALP SERPL-CCNC: 69 U/L (ref 50–136)
ALT SERPL-CCNC: 23 U/L (ref 12–65)
ANION GAP SERPL CALC-SCNC: 8 MMOL/L (ref 7–16)
AST SERPL-CCNC: 32 U/L (ref 15–37)
BACTERIA URNS QL MICRO: NORMAL /HPF
BASOPHILS # BLD: 0 K/UL (ref 0–0.2)
BASOPHILS NFR BLD: 0 % (ref 0–2)
BILIRUB SERPL-MCNC: 0.3 MG/DL (ref 0.2–1.1)
BUN SERPL-MCNC: 15 MG/DL (ref 6–23)
CALCIUM SERPL-MCNC: 8.9 MG/DL (ref 8.3–10.4)
CASTS URNS QL MICRO: NORMAL /LPF
CHLORIDE SERPL-SCNC: 108 MMOL/L (ref 98–107)
CO2 SERPL-SCNC: 25 MMOL/L (ref 21–32)
CREAT SERPL-MCNC: 1.04 MG/DL (ref 0.6–1)
DIFFERENTIAL METHOD BLD: ABNORMAL
EOSINOPHIL # BLD: 0 K/UL (ref 0–0.8)
EOSINOPHIL NFR BLD: 0 % (ref 0.5–7.8)
EPI CELLS #/AREA URNS HPF: NORMAL /HPF
ERYTHROCYTE [DISTWIDTH] IN BLOOD BY AUTOMATED COUNT: 12.7 % (ref 11.9–14.6)
GLOBULIN SER CALC-MCNC: 4.2 G/DL (ref 2.3–3.5)
GLUCOSE SERPL-MCNC: 102 MG/DL (ref 65–100)
HCG UR QL: NEGATIVE
HCT VFR BLD AUTO: 36.5 % (ref 35.8–46.3)
HGB BLD-MCNC: 11.6 G/DL (ref 11.7–15.4)
IMM GRANULOCYTES # BLD AUTO: 0 K/UL (ref 0–0.5)
IMM GRANULOCYTES NFR BLD AUTO: 0 % (ref 0–5)
LIPASE SERPL-CCNC: 69 U/L (ref 73–393)
LYMPHOCYTES # BLD: 1.4 K/UL (ref 0.5–4.6)
LYMPHOCYTES NFR BLD: 29 % (ref 13–44)
MCH RBC QN AUTO: 30.3 PG (ref 26.1–32.9)
MCHC RBC AUTO-ENTMCNC: 31.8 G/DL (ref 31.4–35)
MCV RBC AUTO: 95.3 FL (ref 79.6–97.8)
MONOCYTES # BLD: 0.4 K/UL (ref 0.1–1.3)
MONOCYTES NFR BLD: 8 % (ref 4–12)
NEUTS SEG # BLD: 3.1 K/UL (ref 1.7–8.2)
NEUTS SEG NFR BLD: 63 % (ref 43–78)
NRBC # BLD: 0 K/UL (ref 0–0.2)
PLATELET # BLD AUTO: 201 K/UL (ref 150–450)
PMV BLD AUTO: 10.6 FL (ref 9.4–12.3)
POTASSIUM SERPL-SCNC: 4.2 MMOL/L (ref 3.5–5.1)
PROT SERPL-MCNC: 7.7 G/DL (ref 6.3–8.2)
RBC # BLD AUTO: 3.83 M/UL (ref 4.05–5.2)
RBC #/AREA URNS HPF: NORMAL /HPF
SERVICE CMNT-IMP: NORMAL
SODIUM SERPL-SCNC: 141 MMOL/L (ref 136–145)
WBC # BLD AUTO: 5 K/UL (ref 4.3–11.1)
WBC URNS QL MICRO: NORMAL /HPF
WET PREP GENITAL: NORMAL
WET PREP GENITAL: NORMAL

## 2019-08-12 PROCEDURE — 81003 URINALYSIS AUTO W/O SCOPE: CPT | Performed by: EMERGENCY MEDICINE

## 2019-08-12 PROCEDURE — 83690 ASSAY OF LIPASE: CPT

## 2019-08-12 PROCEDURE — 87210 SMEAR WET MOUNT SALINE/INK: CPT

## 2019-08-12 PROCEDURE — 81025 URINE PREGNANCY TEST: CPT

## 2019-08-12 PROCEDURE — 99284 EMERGENCY DEPT VISIT MOD MDM: CPT | Performed by: EMERGENCY MEDICINE

## 2019-08-12 PROCEDURE — 87491 CHLMYD TRACH DNA AMP PROBE: CPT

## 2019-08-12 PROCEDURE — 85025 COMPLETE CBC W/AUTO DIFF WBC: CPT

## 2019-08-12 PROCEDURE — 80053 COMPREHEN METABOLIC PANEL: CPT

## 2019-08-12 PROCEDURE — 74018 RADEX ABDOMEN 1 VIEW: CPT

## 2019-08-12 PROCEDURE — 81015 MICROSCOPIC EXAM OF URINE: CPT

## 2019-08-12 RX ORDER — FLUCONAZOLE 150 MG/1
150 TABLET ORAL
Qty: 1 TAB | Refills: 1 | Status: SHIPPED | OUTPATIENT
Start: 2019-08-12 | End: 2019-08-12

## 2019-08-12 NOTE — ED TRIAGE NOTES
Pt states she has had lower abdominal cramps since this morning. Denies n/v/d. No problems eating or drinking. States she is malodorous in vaginal area.

## 2019-08-12 NOTE — ED PROVIDER NOTES
Patient is a 67 yo female who presents with abdominal pain. States pain began this morning and located across her entire lower abdomen. States she is currently going through menopause however no recent periods. States she does have some vaginal \"foul smell\" however denies any discharge. States no pain with urination, no fevers or chills, no nausea or vomiting, no further complaints. Overall patient is very well appearing and in NAD. Patient seen by me briefly in triage to begin workup until further evaluation and management by another provider once a room becomes available. Patient states she has been constipated and have cramping in her lower abdomen and foul-smelling vaginal area. Denies discharge. No dysuria. The history is provided by the patient. Abdominal Pain    This is a new problem. The current episode started more than 2 days ago. The problem occurs constantly. The problem has been gradually worsening. The pain is located in the RLQ and LLQ. The quality of the pain is cramping. The pain is moderate. Associated symptoms include constipation. Pertinent negatives include no fever, no diarrhea, no nausea, no vomiting, no dysuria and no frequency. Nothing worsens the pain. The pain is relieved by nothing. Past Medical History:   Diagnosis Date    Arthritis     Autoimmune disease ([address]     lupus    DVT (deep venous thrombosis) (HCC)     to R LE    Esophageal stricture     Gastroesophageal reflux disease     Hypertension     Other ill-defined conditions(799.89)     lupus       Past Surgical History:   Procedure Laterality Date    HX GI      esopheagus stretched x2         No family history on file.     Social History     Socioeconomic History    Marital status:      Spouse name: Not on file    Number of children: Not on file    Years of education: Not on file    Highest education level: Not on file   Occupational History    Not on file   Social Needs    Financial resource strain: Not on file    Food insecurity:     Worry: Not on file     Inability: Not on file    Transportation needs:     Medical: Not on file     Non-medical: Not on file   Tobacco Use    Smoking status: Never Smoker    Smokeless tobacco: Never Used   Substance and Sexual Activity    Alcohol use: No    Drug use: No    Sexual activity: Yes     Partners: Male     Birth control/protection: None   Lifestyle    Physical activity:     Days per week: Not on file     Minutes per session: Not on file    Stress: Not on file   Relationships    Social connections:     Talks on phone: Not on file     Gets together: Not on file     Attends Latter day service: Not on file     Active member of club or organization: Not on file     Attends meetings of clubs or organizations: Not on file     Relationship status: Not on file    Intimate partner violence:     Fear of current or ex partner: Not on file     Emotionally abused: Not on file     Physically abused: Not on file     Forced sexual activity: Not on file   Other Topics Concern    Not on file   Social History Narrative    Not on file         ALLERGIES: Sulfa (sulfonamide antibiotics)    Review of Systems   Constitutional: Negative for chills and fever. Gastrointestinal: Positive for abdominal pain and constipation. Negative for diarrhea, nausea and vomiting. Genitourinary: Negative for dysuria and frequency. All other systems reviewed and are negative. There were no vitals filed for this visit. Physical Exam   Constitutional: She is oriented to person, place, and time. She appears well-developed and well-nourished. No distress. HENT:   Head: Normocephalic and atraumatic. Eyes: Conjunctivae are normal. Right eye exhibits no discharge. Left eye exhibits no discharge. Neck: Normal range of motion. Neck supple. Cardiovascular: Normal rate and regular rhythm.    Pulmonary/Chest: Effort normal and breath sounds normal. No respiratory distress. Abdominal: Soft. She exhibits no distension. There is no tenderness. Genitourinary: Vaginal discharge (Yeast) found. Musculoskeletal: Normal range of motion. She exhibits no edema. Neurological: She is alert and oriented to person, place, and time. No cranial nerve deficit. Skin: Skin is warm and dry. Capillary refill takes less than 2 seconds. She is not diaphoretic. Psychiatric: She has a normal mood and affect. Her behavior is normal.   Nursing note and vitals reviewed.        MDM  Number of Diagnoses or Management Options  Constipation, unspecified constipation type:   Yeast vaginitis:   Diagnosis management comments: Treat yeast vaginitis discussed with patient [employer]       Amount and/or Complexity of Data Reviewed  Clinical lab tests: ordered and reviewed    Risk of Complications, Morbidity, and/or Mortality  Presenting problems: moderate  Diagnostic procedures: moderate  Management options: moderate    Patient Progress  Patient progress: improved         Procedures

## 2019-08-13 NOTE — ED NOTES
I have reviewed discharge instructions with the patient. The patient verbalized understanding. Patient left ED via Discharge Method: ambulatory to Home with self    Opportunity for questions and clarification provided. Patient given 1 scripts. Pt in no acute distress at time of d/c        To continue your aftercare when you leave the hospital, you may receive an automated call from our care team to check in on how you are doing. This is a free service and part of our promise to provide the best care and service to meet your aftercare needs.  If you have questions, or wish to unsubscribe from this service please call [phone]. Thank you for Choosing our [facility] Emergency Department.

## 2019-08-13 NOTE — DISCHARGE INSTRUCTIONS
Patient Education        Take miralax 1-2x per day for constipation. Constipation: Care Instructions  Your Care Instructions    Constipation means that you have a hard time passing stools (bowel movements). People pass stools from 3 times a day to once every 3 days. What is normal for you may be different. Constipation may occur with pain in the rectum and cramping. The pain may get worse when you try to pass stools. Sometimes there are small amounts of bright red blood on toilet paper or the surface of stools. This is because of enlarged veins near the rectum (hemorrhoids). A few changes in your diet and lifestyle may help you avoid ongoing constipation. Your doctor may also prescribe medicine to help loosen your stool. Some medicines can cause constipation. These include pain medicines and antidepressants. Tell your doctor about all the medicines you take. Your doctor may want to make a medicine change to ease your symptoms. Follow-up care is a key part of your treatment and safety. Be sure to make and go to all appointments, and call your doctor if you are having problems. It's also a good idea to know your test results and keep a list of the medicines you take. How can you care for yourself at home? · Drink plenty of fluids, enough so that your urine is light yellow or clear like water. If you have kidney, heart, or liver disease and have to limit fluids, talk with your doctor before you increase the amount of fluids you drink. · Include high-fiber foods in your diet each day. These include fruits, vegetables, beans, and whole grains. · Get at least 30 minutes of exercise on most days of the week. Walking is a good choice. You also may want to do other activities, such as running, swimming, cycling, or playing tennis or team sports. · Take a fiber supplement, such as Citrucel or Metamucil, every day. Read and follow all instructions on the label. · Schedule time each day for a bowel movement.  A daily routine may help. Take your time having your bowel movement. · Support your feet with a small step stool when you sit on the toilet. This helps flex your hips and places your pelvis in a squatting position. · Your doctor may recommend an over-the-counter laxative to relieve your constipation. Examples are Milk of Magnesia and MiraLax. Read and follow all instructions on the label. Do not use laxatives on a long-term basis. When should you call for help? Call your doctor now or seek immediate medical care if:    · You have new or worse belly pain.     · You have new or worse nausea or vomiting.     · You have blood in your stools.    Watch closely for changes in your health, and be sure to contact your doctor if:    · Your constipation is getting worse.     · You do not get better as expected. Where can you learn more? Go to http://licoLumenisbrandie.info/. Enter 21 [phone] in the search box to learn more about \"Constipation: Care Instructions. \"  Current as of: September 23, 2018  Content Version: 12.1  © 8791-0754 [employer]. Care instructions adapted under license by [employer] (which disclaims liability or warranty for this information). If you have questions about a medical condition or this instruction, always ask your healthcare professional. [address] any warranty or liability for your use of this information. Patient Education        Vaginal Yeast Infection: Care Instructions  Your Care Instructions    A vaginal yeast infection is caused by too many yeast cells in the vagina. This is common in women of all ages. Itching, vaginal discharge and irritation, and other symptoms can bother you. But yeast infections don't often cause other health problems. Some medicines can increase your risk of getting a yeast infection. These include antibiotics, birth control pills, hormones, and steroids.  You may also be more likely to get a yeast infection if you are pregnant, have diabetes, douche, or wear tight clothes. With treatment, most yeast infections get better in 2 to 3 days. Follow-up care is a key part of your treatment and safety. Be sure to make and go to all appointments, and call your doctor if you are having problems. It's also a good idea to know your test results and keep a list of the medicines you take. How can you care for yourself at home? · Take your medicines exactly as prescribed. Call your doctor if you think you are having a problem with your medicine. · Ask your doctor about over-the-counter (OTC) medicines for yeast infections. They may cost less than prescription medicines. If you use an OTC treatment, read and follow all instructions on the label. · Do not use tampons while using a vaginal cream or suppository. The tampons can absorb the medicine. Use pads instead. · Wear loose cotton clothing. Do not wear nylon or other fabric that holds body heat and moisture close to the skin. · Try sleeping without underwear. · Do not scratch. Relieve itching with a cold pack or a cool bath. · Do not wash your vaginal area more than once a day. Use plain water or a mild, unscented soap. Air-dry the vaginal area. · Change out of wet swimsuits after swimming. · Do not have sex until you have finished your treatment. · Do not douche. When should you call for help? Call your doctor now or seek immediate medical care if:    · You have unexpected vaginal bleeding.     · You have new or increased pain in your vagina or pelvis.    Watch closely for changes in your health, and be sure to contact your doctor if:    · You have a fever.     · You are not getting better after 2 days.     · Your symptoms come back after you finish your medicines. Where can you learn more? Go to http://lico-brandie.info/. Enter P245 in the search box to learn more about \"Vaginal Yeast Infection: Care Instructions. \"  Current as of: February 19, 2019  Content Version: 12.1  © 6762-3530 [employer]. Care instructions adapted under license by [employer] (which disclaims liability or warranty for this information). If you have questions about a medical condition or this instruction, always ask your healthcare professional. [address] any warranty or liability for your use of this information.

## 2019-08-14 LAB
C TRACH RRNA SPEC QL NAA+PROBE: NEGATIVE
N GONORRHOEA RRNA SPEC QL NAA+PROBE: NEGATIVE
SPECIMEN SOURCE: NORMAL

## 2019-09-05 ENCOUNTER — HOSPITAL ENCOUNTER (EMERGENCY)
Age: 59
Discharge: HOME | End: 2019-09-05
Attending: EMERGENCY MEDICINE
Payer: SELF-PAY

## 2019-09-05 VITALS
HEART RATE: 60 BPM | BODY MASS INDEX: 25.03 KG/M2 | SYSTOLIC BLOOD PRESSURE: 126 MMHG | WEIGHT: 136 LBS | HEIGHT: 62 IN | OXYGEN SATURATION: 100 % | RESPIRATION RATE: 18 BRPM | DIASTOLIC BLOOD PRESSURE: 74 MMHG | TEMPERATURE: 97.9 F

## 2019-09-05 DIAGNOSIS — R60.0 BILATERAL LOWER EXTREMITY EDEMA: Primary | ICD-10-CM

## 2019-09-05 LAB
ALBUMIN SERPL-MCNC: 3.5 G/DL (ref 3.5–5)
ALBUMIN/GLOB SERPL: 0.8 {RATIO} (ref 1.2–3.5)
ALP SERPL-CCNC: 65 U/L (ref 50–136)
ALT SERPL-CCNC: 31 U/L (ref 12–65)
ANION GAP SERPL CALC-SCNC: 8 MMOL/L (ref 7–16)
AST SERPL-CCNC: 38 U/L (ref 15–37)
BASOPHILS # BLD: 0 K/UL (ref 0–0.2)
BASOPHILS NFR BLD: 1 % (ref 0–2)
BILIRUB SERPL-MCNC: 0.4 MG/DL (ref 0.2–1.1)
BUN SERPL-MCNC: 20 MG/DL (ref 6–23)
CALCIUM SERPL-MCNC: 9 MG/DL (ref 8.3–10.4)
CHLORIDE SERPL-SCNC: 109 MMOL/L (ref 98–107)
CO2 SERPL-SCNC: 25 MMOL/L (ref 21–32)
CREAT SERPL-MCNC: 0.88 MG/DL (ref 0.6–1)
DIFFERENTIAL METHOD BLD: ABNORMAL
EOSINOPHIL # BLD: 0 K/UL (ref 0–0.8)
EOSINOPHIL NFR BLD: 1 % (ref 0.5–7.8)
ERYTHROCYTE [DISTWIDTH] IN BLOOD BY AUTOMATED COUNT: 12.5 % (ref 11.9–14.6)
GLOBULIN SER CALC-MCNC: 4.4 G/DL (ref 2.3–3.5)
GLUCOSE SERPL-MCNC: 81 MG/DL (ref 65–100)
HCT VFR BLD AUTO: 37.2 % (ref 35.8–46.3)
HGB BLD-MCNC: 12.1 G/DL (ref 11.7–15.4)
IMM GRANULOCYTES # BLD AUTO: 0 K/UL (ref 0–0.5)
IMM GRANULOCYTES NFR BLD AUTO: 0 % (ref 0–5)
LYMPHOCYTES # BLD: 1.3 K/UL (ref 0.5–4.6)
LYMPHOCYTES NFR BLD: 29 % (ref 13–44)
MCH RBC QN AUTO: 30.6 PG (ref 26.1–32.9)
MCHC RBC AUTO-ENTMCNC: 32.5 G/DL (ref 31.4–35)
MCV RBC AUTO: 94.2 FL (ref 79.6–97.8)
MONOCYTES # BLD: 0.6 K/UL (ref 0.1–1.3)
MONOCYTES NFR BLD: 14 % (ref 4–12)
NEUTS SEG # BLD: 2.4 K/UL (ref 1.7–8.2)
NEUTS SEG NFR BLD: 55 % (ref 43–78)
NRBC # BLD: 0 K/UL (ref 0–0.2)
PLATELET # BLD AUTO: 197 K/UL (ref 150–450)
PMV BLD AUTO: 10.9 FL (ref 9.4–12.3)
POTASSIUM SERPL-SCNC: 4.3 MMOL/L (ref 3.5–5.1)
PROT SERPL-MCNC: 7.9 G/DL (ref 6.3–8.2)
RBC # BLD AUTO: 3.95 M/UL (ref 4.05–5.2)
SODIUM SERPL-SCNC: 142 MMOL/L (ref 136–145)
WBC # BLD AUTO: 4.4 K/UL (ref 4.3–11.1)

## 2019-09-05 PROCEDURE — 80053 COMPREHEN METABOLIC PANEL: CPT

## 2019-09-05 PROCEDURE — 99283 EMERGENCY DEPT VISIT LOW MDM: CPT | Performed by: EMERGENCY MEDICINE

## 2019-09-05 PROCEDURE — 85025 COMPLETE CBC W/AUTO DIFF WBC: CPT

## 2019-09-05 NOTE — ED TRIAGE NOTES
Pt to ed with c/o bilateral feet swelling for 3 days - pt [facility] and she was not able to be seen and sent to the ed - pt reports that she started a second BP medication 1 month ago - pt reports that her feet are hurting with ambulation - pt denies any n/v/d - pt denies any chest pain or shob

## 2019-09-06 NOTE — ED NOTES
I have reviewed discharge instructions with the patient. The patient verbalized understanding. Patient left ED via Discharge Method: ambulatory to Home with self. Opportunity for questions and clarification provided. Patient given 1 scripts. To continue your aftercare when you leave the hospital, you may receive an automated call from our care team to check in on how you are doing. This is a free service and part of our promise to provide the best care and service to meet your aftercare needs.  If you have questions, or wish to unsubscribe from this service please call [phone]. Thank you for Choosing our [address] [place] [address] Emergency Department.

## 2019-09-06 NOTE — ED PROVIDER NOTES
Mikki Valenzuela is a 66 y.o. female seen on 9/5/2019 at 8:04 PM in the [facility] EMERGENCY DEPT in room ERM/M. Chief Complaint   Patient presents with    Foot Swelling     HPI: 51-year-old female presenting to the emergency department for evaluation of bilateral lower extremity edema. She states her symptoms been present for the last couple of days. She notes that it is worse in the evenings after work and gets better in the mornings. She states that she spends all day standing up and working on her feet. She noticed that she can see her sock line around her ankle when she gets home. She has not had any chest pain or shortness of breath. No cough no history of heart problems, myocardial infarction, or congestive heart failure. She also was recently started on amlodipine for hypertension. She is taking 10 mg. Historian: Patient    REVIEW OF SYSTEMS     Review of Systems   Constitutional: Negative for fever. HENT: Negative. Eyes: Negative. Respiratory: Negative for cough, chest tightness, shortness of breath and wheezing. Cardiovascular: Positive for leg swelling. Negative for chest pain. Gastrointestinal: Negative for abdominal distention, abdominal pain, constipation, diarrhea and vomiting. Endocrine: Negative. Genitourinary: Negative for dysuria, flank pain, frequency and urgency. Neurological: Negative for dizziness, syncope and headaches. Psychiatric/Behavioral: Negative. All other systems reviewed and are negative.       PAST MEDICAL HISTORY     Past Medical History:   Diagnosis Date    Arthritis     Autoimmune disease ([facility] [address]     lupus    DVT (deep venous thrombosis) (H[facility])     to R LE    Esophageal stricture     Gastroesophageal reflux disease     Hypertension     Other ill-defined conditions(799.89)     lupus     Past Surgical History:   Procedure Laterality Date    HX GI      esopheagus stretched x2     Social History     Socioeconomic History    Marital status:      Spouse name: Not on file    Number of children: Not on file    Years of education: Not on file    Highest education level: Not on file   Tobacco Use    Smoking status: Never Smoker    Smokeless tobacco: Never Used   Substance and Sexual Activity    Alcohol use: No    Drug use: No    Sexual activity: Yes     Partners: Male     Birth control/protection: None     Prior to Admission Medications   Prescriptions Last Dose Informant Patient Reported? Taking? KLOR-CON 10 10 mEq tablet  Self Yes No   Sig: Take 20 mEq by mouth daily. Pt takes one tab daily except Wed and . Pt takes 0.5 tab on Sun     albuterol (PROVENTIL HFA, VENTOLIN HFA, PROAIR HFA) 90 mcg/actuation inhaler   No No   Sig: Take 2 Puffs by inhalation every six (6) hours as needed for Wheezing. amLODIPine (NORVASC) 5 mg tablet   Yes No   Sig: Take 5 mg by mouth daily. budesonide (RHINOCORT AQUA) 32 mcg/actuation nasal spray   Yes No   Si Sprays by Both Nostrils route. cloNIDine (CATAPRES) 0.1 mg tablet   Yes No   Sig: Take 0.1 mg by mouth as needed. For her hot flashes   cyclobenzaprine (FLEXERIL) 5 mg tablet   No No   Sig: Take 2 Tabs by mouth three (3) times daily as needed (pain). dexamethasone (DECADRON) 4 mg tablet   No No   Sig: Take 1 tab every 8 hours for the next 3 days. hydroxychloroquine (PLAQUINIL) 200 mg tablet  Self Yes No   Sig: Take 200 mg by mouth two (2) times a day. lisinopril (PRINIVIL, ZESTRIL) 5 mg tablet   Yes No   Sig: Take  by mouth daily. loratadine (CLARITIN) 10 mg tablet   Yes No   Sig: Take 10 mg by mouth daily. omeprazole (PRILOSEC) 40 mg capsule   No No   Sig: Take 1 Cap by mouth daily. traMADol (ULTRAM) 50 mg tablet   No No   Sig: Take 1 Tab by mouth every six (6) hours as needed for Pain. Max Daily Amount: 200 mg.   warfarin (COUMADIN) 5 mg tablet  Self Yes No   Sig: Take 5 mg by mouth daily.  7.5 on Wed, Fri Facility-Administered Medications: None     Allergies   Allergen Reactions    Sulfa (Sulfonamide Antibiotics) Nausea and Vomiting        PHYSICAL EXAM       Vitals:    09/05/19 1819   BP: 137/84   Pulse: 81   Resp: 20   Temp: 98 °F (36.7 °C)   SpO2: 100%    Vital signs were reviewed. Physical Exam   Constitutional: She is oriented to person, place, and time. She appears well-developed and well-nourished. No distress. HENT:   Head: Normocephalic and atraumatic. Eyes: Pupils are equal, round, and reactive to light. EOM are normal.   Neck: Normal range of motion. Neck supple. Cardiovascular: Normal rate, regular rhythm, normal heart sounds and intact distal pulses. Exam reveals no gallop and no friction rub. No murmur heard. Pulmonary/Chest: Effort normal and breath sounds normal. No stridor. No respiratory distress. She has no wheezes. She has no rales. No crackles   Abdominal: Soft. Bowel sounds are normal. She exhibits no distension and no mass. There is no tenderness. There is no rebound and no guarding. Musculoskeletal: Normal range of motion. She exhibits edema (2+ bilateral LE edema). She exhibits no tenderness or deformity. No erythema, no increased warmth   Neurological: She is alert and oriented to person, place, and time. No sensory deficit. No focal neuro deficits   Skin: Skin is warm and dry. No rash noted. She is not diaphoretic. No erythema. Psychiatric: She has a normal mood and affect. Her behavior is normal.   Vitals reviewed. MEDICAL DECISION MAKING     Differential Diagnosis: Medication side effect, dependent edema, CHF, DVT    MDM  Number of Diagnoses or Management Options     Amount and/or Complexity of Data Reviewed  Clinical lab tests: ordered and reviewed      Procedures    ED Course: Symptoms do not appear consistent with DVT. Her lungs are clear, no history of cardiac problems. I suspect this is either dependent edema or related to amlodipine.   I recommend that she contact her primary care doctor's office to discuss changing medications and switching to a different antihypertensive. Also I will write her for some graded compression hose to see if this helps with her swelling. Labs are normal, VSS, will dc home at this time. Disposition: Discharge home  Diagnosis: bilateral lower extremity edema  ____________________________________________________________________  A portion of this note was generated using voice recognition dictation software. While the note has been reviewed for accuracy, please note certain words and phrases may not be transcribed as intended and some grammatical and/or typographical errors may be present.

## 2019-09-17 ENCOUNTER — APPOINTMENT (OUTPATIENT)
Dept: GENERAL RADIOLOGY | Age: 59
End: 2019-09-17
Attending: EMERGENCY MEDICINE
Payer: SELF-PAY

## 2019-09-17 ENCOUNTER — APPOINTMENT (OUTPATIENT)
Dept: CT IMAGING | Age: 59
End: 2019-09-17
Attending: EMERGENCY MEDICINE
Payer: SELF-PAY

## 2019-09-17 ENCOUNTER — HOSPITAL ENCOUNTER (EMERGENCY)
Age: 59
Discharge: HOME | End: 2019-09-17
Attending: EMERGENCY MEDICINE
Payer: SELF-PAY

## 2019-09-17 VITALS
HEART RATE: 51 BPM | TEMPERATURE: 98.9 F | HEIGHT: 62 IN | RESPIRATION RATE: 16 BRPM | WEIGHT: 137 LBS | DIASTOLIC BLOOD PRESSURE: 78 MMHG | BODY MASS INDEX: 25.21 KG/M2 | OXYGEN SATURATION: 100 % | SYSTOLIC BLOOD PRESSURE: 154 MMHG

## 2019-09-17 DIAGNOSIS — M79.18 MUSCULOSKELETAL PAIN: Primary | ICD-10-CM

## 2019-09-17 PROCEDURE — 72125 CT NECK SPINE W/O DYE: CPT

## 2019-09-17 PROCEDURE — 71046 X-RAY EXAM CHEST 2 VIEWS: CPT

## 2019-09-17 PROCEDURE — 99284 EMERGENCY DEPT VISIT MOD MDM: CPT | Performed by: EMERGENCY MEDICINE

## 2019-09-17 PROCEDURE — 72100 X-RAY EXAM L-S SPINE 2/3 VWS: CPT

## 2019-09-17 RX ORDER — CYCLOBENZAPRINE HCL 5 MG
10 TABLET ORAL
Qty: 20 TAB | Refills: 0 | Status: SHIPPED | OUTPATIENT
Start: 2019-09-17

## 2019-09-17 RX ORDER — TRAMADOL HYDROCHLORIDE 50 MG/1
50 TABLET ORAL
Qty: 13 TAB | Refills: 0 | Status: SHIPPED | OUTPATIENT
Start: 2019-09-17 | End: 2019-09-20
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
## 2019-09-17 NOTE — LETTER
[address] [facility] EMERGENCY DEPT 
[address] [facility] [address] 
[phone] Work/School Note Date: 9/17/2019 To Whom It May concern: 
 
Ana Saleem was seen and treated today in the emergency room by the following provider(s): 
Attending Provider: Orin Vanessa MD. Ana Saleem may return to work on 09/19/2019.  
 
Sincerely, 
 
 
 
 
Alexis Gardiner RN

## 2019-09-18 NOTE — ED NOTES
I have reviewed discharge instructions with the patient and family member. The patient and family member verbalized understanding. Patient left ED via Discharge Method: ambulatory to Home with family member    Opportunity for questions and clarification provided. Patient given 2 scripts. To continue your aftercare when you leave the hospital, you may receive an automated call from our care team to check in on how you are doing. This is a free service and part of our promise to provide the best care and service to meet your aftercare needs.  If you have questions, or wish to unsubscribe from this service please call [phone]. Thank you for Choosing our [facility] Emergency Department.

## 2019-11-15 ENCOUNTER — APPOINTMENT (OUTPATIENT)
Dept: GENERAL RADIOLOGY | Age: 59
End: 2019-11-15
Attending: EMERGENCY MEDICINE
Payer: SELF-PAY

## 2019-11-15 ENCOUNTER — HOSPITAL ENCOUNTER (EMERGENCY)
Age: 59
Discharge: HOME | End: 2019-11-15
Attending: EMERGENCY MEDICINE
Payer: SELF-PAY

## 2019-11-15 VITALS
DIASTOLIC BLOOD PRESSURE: 84 MMHG | TEMPERATURE: 98.6 F | OXYGEN SATURATION: 100 % | RESPIRATION RATE: 20 BRPM | HEART RATE: 73 BPM | SYSTOLIC BLOOD PRESSURE: 158 MMHG

## 2019-11-15 DIAGNOSIS — R07.89 ATYPICAL CHEST PAIN: Primary | ICD-10-CM

## 2019-11-15 LAB
ALBUMIN SERPL-MCNC: 3.5 G/DL (ref 3.5–5)
ALBUMIN/GLOB SERPL: 0.7 {RATIO} (ref 1.2–3.5)
ALP SERPL-CCNC: 68 U/L (ref 50–136)
ALT SERPL-CCNC: 29 U/L (ref 12–65)
ANION GAP SERPL CALC-SCNC: 7 MMOL/L (ref 7–16)
AST SERPL-CCNC: 35 U/L (ref 15–37)
BASOPHILS # BLD: 0 K/UL (ref 0–0.2)
BASOPHILS NFR BLD: 1 % (ref 0–2)
BILIRUB SERPL-MCNC: 0.4 MG/DL (ref 0.2–1.1)
BUN SERPL-MCNC: 13 MG/DL (ref 6–23)
CALCIUM SERPL-MCNC: 9.3 MG/DL (ref 8.3–10.4)
CHLORIDE SERPL-SCNC: 107 MMOL/L (ref 98–107)
CO2 SERPL-SCNC: 27 MMOL/L (ref 21–32)
CREAT SERPL-MCNC: 0.95 MG/DL (ref 0.6–1)
DIFFERENTIAL METHOD BLD: ABNORMAL
EOSINOPHIL # BLD: 0 K/UL (ref 0–0.8)
EOSINOPHIL NFR BLD: 0 % (ref 0.5–7.8)
ERYTHROCYTE [DISTWIDTH] IN BLOOD BY AUTOMATED COUNT: 12.3 % (ref 11.9–14.6)
GLOBULIN SER CALC-MCNC: 4.7 G/DL (ref 2.3–3.5)
GLUCOSE SERPL-MCNC: 99 MG/DL (ref 65–100)
HCT VFR BLD AUTO: 38.8 % (ref 35.8–46.3)
HGB BLD-MCNC: 12.5 G/DL (ref 11.7–15.4)
IMM GRANULOCYTES # BLD AUTO: 0 K/UL (ref 0–0.5)
IMM GRANULOCYTES NFR BLD AUTO: 0 % (ref 0–5)
INR PPP: 2.1
LYMPHOCYTES # BLD: 1.4 K/UL (ref 0.5–4.6)
LYMPHOCYTES NFR BLD: 36 % (ref 13–44)
MCH RBC QN AUTO: 29.7 PG (ref 26.1–32.9)
MCHC RBC AUTO-ENTMCNC: 32.2 G/DL (ref 31.4–35)
MCV RBC AUTO: 92.2 FL (ref 79.6–97.8)
MONOCYTES # BLD: 0.4 K/UL (ref 0.1–1.3)
MONOCYTES NFR BLD: 10 % (ref 4–12)
NEUTS SEG # BLD: 2 K/UL (ref 1.7–8.2)
NEUTS SEG NFR BLD: 53 % (ref 43–78)
NRBC # BLD: 0 K/UL (ref 0–0.2)
PLATELET # BLD AUTO: 209 K/UL (ref 150–450)
PMV BLD AUTO: 9.9 FL (ref 9.4–12.3)
POTASSIUM SERPL-SCNC: 4 MMOL/L (ref 3.5–5.1)
PROT SERPL-MCNC: 8.2 G/DL (ref 6.3–8.2)
PROTHROMBIN TIME: 24.2 SEC (ref 11.7–14.5)
RBC # BLD AUTO: 4.21 M/UL (ref 4.05–5.2)
SODIUM SERPL-SCNC: 141 MMOL/L (ref 136–145)
TROPONIN I SERPL-MCNC: <0.02 NG/ML (ref 0.02–0.05)
WBC # BLD AUTO: 3.8 K/UL (ref 4.3–11.1)

## 2019-11-15 PROCEDURE — 71046 X-RAY EXAM CHEST 2 VIEWS: CPT

## 2019-11-15 PROCEDURE — 93005 ELECTROCARDIOGRAM TRACING: CPT | Performed by: EMERGENCY MEDICINE

## 2019-11-15 PROCEDURE — 84484 ASSAY OF TROPONIN QUANT: CPT

## 2019-11-15 PROCEDURE — 85610 PROTHROMBIN TIME: CPT

## 2019-11-15 PROCEDURE — 80053 COMPREHEN METABOLIC PANEL: CPT

## 2019-11-15 PROCEDURE — 99284 EMERGENCY DEPT VISIT MOD MDM: CPT | Performed by: EMERGENCY MEDICINE

## 2019-11-15 PROCEDURE — 85025 COMPLETE CBC W/AUTO DIFF WBC: CPT

## 2019-11-15 NOTE — ED TRIAGE NOTES
Pt ambulatory to triage without complications. Pt reports cough at night sometimes and states she coughed so hard and she felt like something pulled in her chest. Pt reports nasal congestion. Pt states she has been burping a lot. Pt states she has been exercising in the morning for about a year as well and some pain when she is doing that.  Pt is on coumadin, lisinopril, norvasc,

## 2019-11-15 NOTE — ED NOTES
I have reviewed discharge instructions with the patient. The patient verbalized understanding. Patient left ED via Discharge Method: ambulatory to Home with family. Opportunity for questions and clarification provided. Patient given 0 scripts. To continue your aftercare when you leave the hospital, you may receive an automated call from our care team to check in on how you are doing. This is a free service and part of our promise to provide the best care and service to meet your aftercare needs.  If you have questions, or wish to unsubscribe from this service please call [phone]. Thank you for Choosing our [facility] Emergency Department.

## 2019-11-15 NOTE — DISCHARGE INSTRUCTIONS
As we discussed, we did not find the exact cause of your symptoms today in the emergency department. Therefore, it is important for you to follow-up with your primary care doctor for reevaluation. Please return with any fevers, vomiting, difficulty breathing, worsening symptoms, or additional concerns.

## 2019-11-15 NOTE — ED PROVIDER NOTES
Provider in triage note:  70-year-old black female with history of lupus, DVT, hypertension presents the emergency department with a complaint of intermittent cough for several weeks, left-sided chest pain starting 2 days ago after a rather significant amount of coughing which has persisted. She denies any associated shortness of breath diaphoresis nausea or vomiting. She denies missing any of her doses of Coumadin. Chest pain is been constant, dull, under the left breast.  On exam, vital signs are stable, chest pain is not reproducible, though exam is limited secondary to being in triage, lungs are clear to auscultation and heart rate is regular without evidence of murmur or irregular rhythm. There is no evidence of lower extremity edema. Patient be screened with cardiac work-up as well as chest x-ray. She will also be screened with a PT/INR. At this time the patient's discomfort appears more musculoskeletal in nature. Patient has a long-standing history of allergies and thinks her cough is from same. Alonzo Perry MD    Note from Dr. Ramila Cervantes: Agree with above. Additional history: Patient says that her pain has decreased significantly since she has been passing gas. She says that has made a large amount of the pain go away. She denies any other significant symptoms. Elements of this note were created using speech recognition software. As such, errors of speech recognition may be present. Cough   Pertinent negatives include no chest pain, no chills, no eye redness, no headaches, no rhinorrhea, no sore throat, no myalgias, no shortness of breath, no wheezing, no nausea, no vomiting and no confusion.         Past Medical History:   Diagnosis Date    Arthritis     Autoimmune disease ([facility] [address]     lupus    DVT (deep venous thrombosis) ([facility])     to R LE    Esophageal stricture     Gastroesophageal reflux disease     Hypertension     Other ill-defined conditions(259.89)     lupus       Past Surgical History:   Procedure Laterality Date    HX GI      esopheagus stretched x2         No family history on file. Social History     Socioeconomic History    Marital status:      Spouse name: Not on file    Number of children: Not on file    Years of education: Not on file    Highest education level: Not on file   Occupational History    Not on file   Social Needs    Financial resource strain: Not on file    Food insecurity:     Worry: Not on file     Inability: Not on file    Transportation needs:     Medical: Not on file     Non-medical: Not on file   Tobacco Use    Smoking status: Never Smoker    Smokeless tobacco: Never Used   Substance and Sexual Activity    Alcohol use: No    Drug use: No    Sexual activity: Yes     Partners: Male     Birth control/protection: None   Lifestyle    Physical activity:     Days per week: Not on file     Minutes per session: Not on file    Stress: Not on file   Relationships    Social connections:     Talks on phone: Not on file     Gets together: Not on file     Attends Zoroastrian service: Not on file     Active member of club or organization: Not on file     Attends meetings of clubs or organizations: Not on file     Relationship status: Not on file    Intimate partner violence:     Fear of current or ex partner: Not on file     Emotionally abused: Not on file     Physically abused: Not on file     Forced sexual activity: Not on file   Other Topics Concern    Not on file   Social History Narrative    Not on file         ALLERGIES: Sulfa (sulfonamide antibiotics)    Review of Systems   Constitutional: Negative for chills, diaphoresis and fever. HENT: Negative for congestion, rhinorrhea and sore throat. Eyes: Negative for redness and visual disturbance. Respiratory: Positive for cough and chest tightness. Negative for shortness of breath and wheezing. Cardiovascular: Negative for chest pain and palpitations.    Gastrointestinal: Negative for abdominal pain, blood in stool, diarrhea, nausea and vomiting. Endocrine: Negative for polydipsia and polyuria. Genitourinary: Negative for dysuria and hematuria. Musculoskeletal: Negative for arthralgias, myalgias and neck stiffness. Skin: Negative for rash. Allergic/Immunologic: Negative for environmental allergies and food allergies. Neurological: Negative for dizziness, weakness and headaches. Hematological: Negative for adenopathy. Does not bruise/bleed easily. Psychiatric/Behavioral: Negative for confusion and sleep disturbance. The patient is not nervous/anxious. Vitals:    11/15/19 1339   BP: 148/69   Pulse: 93   Resp: 16   Temp: 98.6 °F (37 °C)   SpO2: 96%            Physical Exam   Constitutional: She is oriented to person, place, and time. She appears well-developed and well-nourished. HENT:   Head: Normocephalic and atraumatic. Eyes: Pupils are equal, round, and reactive to light. Conjunctivae and EOM are normal.   Neck: Normal range of motion. Cardiovascular: Normal rate and regular rhythm. Pulmonary/Chest: Effort normal and breath sounds normal. No respiratory distress. She has no wheezes. She has no rales. She exhibits no tenderness. Abdominal: Soft. Bowel sounds are normal. There is no rebound and no guarding. Musculoskeletal: Normal range of motion. She exhibits no edema or tenderness. Lymphadenopathy:     She has no cervical adenopathy. Neurological: She is alert and oriented to person, place, and time. Skin: Skin is warm and dry. Psychiatric: She has a normal mood and affect. Nursing note and vitals reviewed. MDM  Number of Diagnoses or Management Options  Diagnosis management comments: Patient's chest x-ray was negative. Her troponin was negative. Her symptoms seem more GI or perhaps muscle skeletal in nature. I do not think she is having an acute coronary syndrome and I will discharge her home.   Patient's EKG is unremarkable with no acute ischemic changes and a normal sinus rhythm.          Procedures

## 2019-11-16 LAB
ATRIAL RATE: 71 BPM
CALCULATED P AXIS, ECG09: 63 DEGREES
CALCULATED R AXIS, ECG10: 69 DEGREES
CALCULATED T AXIS, ECG11: 38 DEGREES
DIAGNOSIS, 93000: NORMAL
P-R INTERVAL, ECG05: 124 MS
Q-T INTERVAL, ECG07: 380 MS
QRS DURATION, ECG06: 86 MS
QTC CALCULATION (BEZET), ECG08: 412 MS
VENTRICULAR RATE, ECG03: 71 BPM

## 2020-03-25 ENCOUNTER — APPOINTMENT (OUTPATIENT)
Dept: GENERAL RADIOLOGY | Age: 60
End: 2020-03-25
Attending: NURSE PRACTITIONER
Payer: SELF-PAY

## 2020-03-25 ENCOUNTER — HOSPITAL ENCOUNTER (EMERGENCY)
Age: 60
Discharge: HOME | End: 2020-03-25
Attending: EMERGENCY MEDICINE
Payer: SELF-PAY

## 2020-03-25 VITALS
DIASTOLIC BLOOD PRESSURE: 67 MMHG | SYSTOLIC BLOOD PRESSURE: 148 MMHG | OXYGEN SATURATION: 99 % | TEMPERATURE: 98 F | WEIGHT: 137 LBS | HEART RATE: 68 BPM | HEIGHT: 62 IN | BODY MASS INDEX: 25.21 KG/M2 | RESPIRATION RATE: 16 BRPM

## 2020-03-25 DIAGNOSIS — R11.0 NAUSEA WITHOUT VOMITING: Primary | ICD-10-CM

## 2020-03-25 PROCEDURE — 99283 EMERGENCY DEPT VISIT LOW MDM: CPT

## 2020-03-25 PROCEDURE — 74011250637 HC RX REV CODE- 250/637: Performed by: NURSE PRACTITIONER

## 2020-03-25 PROCEDURE — 74022 RADEX COMPL AQT ABD SERIES: CPT

## 2020-03-25 RX ORDER — PROMETHAZINE HYDROCHLORIDE 25 MG/1
25 TABLET ORAL
Status: COMPLETED | OUTPATIENT
Start: 2020-03-25 | End: 2020-03-25

## 2020-03-25 RX ORDER — PROMETHAZINE HYDROCHLORIDE 25 MG/1
25 TABLET ORAL
Qty: 12 TAB | Refills: 0 | Status: SHIPPED | OUTPATIENT
Start: 2020-03-25

## 2020-03-25 RX ADMIN — PROMETHAZINE HYDROCHLORIDE 25 MG: 25 TABLET ORAL at 11:00

## 2020-03-25 NOTE — DISCHARGE INSTRUCTIONS
Phenergan as prescribed for nausea. Follow up with your primary care provider for a recheck if symptoms fail to improve or worsen. Return to the emergency department as needed.

## 2020-03-25 NOTE — ED TRIAGE NOTES
Patient reports feeling nauseated since yesterday. Denies vomiting or pain. States feels good but she has been burping.

## 2020-03-25 NOTE — ED PROVIDER NOTES
Patient presents with feeling nauseated since yesterday. She denies vomiting. She denies abdominal pain, fever, diarrhea, cough, and congestion. She is alert and oriented. She is speaking in complete sentences. The history is provided by the patient. Nausea    This is a new problem. The current episode started yesterday. The problem has not changed since onset. There has been no fever. Pertinent negatives include no chills, no fever, no sweats, no abdominal pain, no diarrhea, no headaches, no arthralgias, no myalgias, no cough, no URI and no headaches. Past Medical History:   Diagnosis Date    Arthritis     Autoimmune disease ([facility] [address]     lupus    DVT (deep venous thrombosis) ([facility])     to R LE    Esophageal stricture     Gastroesophageal reflux disease     Hypertension     Other ill-defined conditions(799.89)     lupus       Past Surgical History:   Procedure Laterality Date    HX GI      esopheagus stretched x2         No family history on file.     Social History     Socioeconomic History    Marital status:      Spouse name: Not on file    Number of children: Not on file    Years of education: Not on file    Highest education level: Not on file   Occupational History    Not on file   Social Needs    Financial resource strain: Not on file    Food insecurity     Worry: Not on file     Inability: Not on file    Transportation needs     Medical: Not on file     Non-medical: Not on file   Tobacco Use    Smoking status: Never Smoker    Smokeless tobacco: Never Used   Substance and Sexual Activity    Alcohol use: No    Drug use: No    Sexual activity: Yes     Partners: Male     Birth control/protection: None   Lifestyle    Physical activity     Days per week: Not on file     Minutes per session: Not on file    Stress: Not on file   Relationships    Social connections     Talks on phone: Not on file     Gets together: Not on file     Attends Rastafarian service: Not on file     Active member of club or organization: Not on file     Attends meetings of clubs or organizations: Not on file     Relationship status: Not on file    Intimate partner violence     Fear of current or ex partner: Not on file     Emotionally abused: Not on file     Physically abused: Not on file     Forced sexual activity: Not on file   Other Topics Concern    Not on file   Social History Narrative    Not on file         ALLERGIES: Sulfa (sulfonamide antibiotics)    Review of Systems   Constitutional: Negative for chills and fever. Respiratory: Negative for cough. Gastrointestinal: Positive for nausea. Negative for abdominal pain and diarrhea. Musculoskeletal: Negative for arthralgias and myalgias. Neurological: Negative for headaches. Vitals:    03/25/20 0951 03/25/20 1100   BP: 182/87 148/67   Pulse: 90 68   Resp: 16 16   Temp: 98.4 °F (36.9 °C) 98 °F (36.7 °C)   SpO2: 98% 99%   Weight: 62.1 kg (137 lb)    Height: 5' 2\" (1.575 m)             Physical Exam  Vitals signs and nursing note reviewed. Constitutional:       Appearance: Normal appearance. HENT:      Head: Normocephalic and atraumatic. Right Ear: Tympanic membrane normal.      Left Ear: Tympanic membrane normal.      Nose: Nose normal.      Mouth/Throat:      Mouth: Mucous membranes are moist.   Eyes:      Pupils: Pupils are equal, round, and reactive to light. Neck:      Musculoskeletal: Normal range of motion and neck supple. Cardiovascular:      Rate and Rhythm: Normal rate and regular rhythm. Pulses: Normal pulses. Heart sounds: Normal heart sounds. Pulmonary:      Effort: Pulmonary effort is normal.      Breath sounds: Normal breath sounds. Abdominal:      General: Abdomen is flat. Bowel sounds are normal.      Palpations: Abdomen is soft. Tenderness: There is no abdominal tenderness. Skin:     General: Skin is warm and dry. Neurological:      General: No focal deficit present.       Mental Status: She is alert and oriented to person, place, and time. GCS: GCS eye subscore is 4. GCS verbal subscore is 5. GCS motor subscore is 6. Cranial Nerves: Cranial nerves are intact. Sensory: Sensation is intact. Motor: Motor function is intact. Psychiatric:         Mood and Affect: Mood normal.         Behavior: Behavior normal.        Xr Abd Acute W 1 V Chest    Result Date: 3/25/2020  Acute abdominal series 3/25/2020 10:31 AM Comparison: Chest x-ray 11/15/2019 and prior Indication: Abdominal pain Findings: Chest: PA view of the chest was obtained. The cardiac and mediastinal contours are normal. No infiltrate, effusion, or pneumothorax. No definite acute osseous abnormality. No free air under the diaphragms. Abdomen: Upright and flat AP views of the abdomen were obtained. Nonobstructed bowel gas pattern. No pathologic calcifications. No acute osseous abnormality. No free air. IMPRESSION: 1. No focal pulmonary opacity. 2. Nonobstructed bowel gas pattern. I wore appropriate PPE throughout this patient's ED visit. Yoshi Hogan, APRN, 11:19 AM       MDM  Number of Diagnoses or Management Options  Nausea without vomiting: new and does not require workup  Diagnosis management comments: Xray negative for acute abnormalities.         Amount and/or Complexity of Data Reviewed  Tests in the radiology section of CPT®: reviewed and ordered  Tests in the medicine section of CPT®: ordered    Patient Progress  Patient progress: stable         Procedures

## 2020-03-25 NOTE — ED NOTES
I have reviewed discharge instructions with the patient. The patient verbalized understanding. Patient left ED via Discharge Method: ambulatory to Home with self. Opportunity for questions and clarification provided. Patient given 1 scripts. To continue your aftercare when you leave the hospital, you may receive an automated call from our care team to check in on how you are doing. This is a free service and part of our promise to provide the best care and service to meet your aftercare needs.  If you have questions, or wish to unsubscribe from this service please call [phone]. Thank you for Choosing our [facility] Emergency Department.

## 2020-04-13 ENCOUNTER — HOSPITAL ENCOUNTER (EMERGENCY)
Age: 60
Discharge: HOME | End: 2020-04-13
Attending: EMERGENCY MEDICINE
Payer: SELF-PAY

## 2020-04-13 VITALS
HEART RATE: 82 BPM | DIASTOLIC BLOOD PRESSURE: 82 MMHG | SYSTOLIC BLOOD PRESSURE: 155 MMHG | TEMPERATURE: 98.8 F | RESPIRATION RATE: 16 BRPM | OXYGEN SATURATION: 100 %

## 2020-04-13 DIAGNOSIS — R07.9 CHEST PAIN, UNSPECIFIED TYPE: Primary | ICD-10-CM

## 2020-04-13 DIAGNOSIS — E87.20 METABOLIC ACIDOSIS: ICD-10-CM

## 2020-04-13 LAB
ALBUMIN SERPL-MCNC: 2.9 G/DL (ref 3.5–5)
ALBUMIN/GLOB SERPL: 0.5 {RATIO} (ref 1.2–3.5)
ALP SERPL-CCNC: 60 U/L (ref 50–136)
ALT SERPL-CCNC: 26 U/L (ref 12–65)
ANION GAP SERPL CALC-SCNC: 19 MMOL/L (ref 7–16)
ANION GAP SERPL CALC-SCNC: 6 MMOL/L (ref 7–16)
APAP SERPL-MCNC: <2 UG/ML (ref 10–30)
AST SERPL-CCNC: 56 U/L (ref 15–37)
ATRIAL RATE: 89 BPM
BILIRUB SERPL-MCNC: 0.3 MG/DL (ref 0.2–1.1)
BUN SERPL-MCNC: 11 MG/DL (ref 6–23)
BUN SERPL-MCNC: 17 MG/DL (ref 6–23)
CALCIUM SERPL-MCNC: 9.3 MG/DL (ref 8.3–10.4)
CALCIUM SERPL-MCNC: 9.3 MG/DL (ref 8.3–10.4)
CALCULATED P AXIS, ECG09: 71 DEGREES
CALCULATED R AXIS, ECG10: 77 DEGREES
CALCULATED T AXIS, ECG11: 12 DEGREES
CHLORIDE SERPL-SCNC: 108 MMOL/L (ref 98–107)
CHLORIDE SERPL-SCNC: 114 MMOL/L (ref 98–107)
CO2 SERPL-SCNC: 12 MMOL/L (ref 21–32)
CO2 SERPL-SCNC: 24 MMOL/L (ref 21–32)
CREAT SERPL-MCNC: 0.82 MG/DL (ref 0.6–1)
CREAT SERPL-MCNC: 0.98 MG/DL (ref 0.6–1)
DIAGNOSIS, 93000: NORMAL
ERYTHROCYTE [DISTWIDTH] IN BLOOD BY AUTOMATED COUNT: 12.5 % (ref 11.9–14.6)
GLOBULIN SER CALC-MCNC: 6.2 G/DL (ref 2.3–3.5)
GLUCOSE SERPL-MCNC: 88 MG/DL (ref 65–100)
GLUCOSE SERPL-MCNC: 92 MG/DL (ref 65–100)
HCT VFR BLD AUTO: 40.9 % (ref 35.8–46.3)
HGB BLD-MCNC: 13.1 G/DL (ref 11.7–15.4)
LACTATE SERPL-SCNC: 0.9 MMOL/L (ref 0.4–2)
MCH RBC QN AUTO: 30.8 PG (ref 26.1–32.9)
MCHC RBC AUTO-ENTMCNC: 32 G/DL (ref 31.4–35)
MCV RBC AUTO: 96.2 FL (ref 79.6–97.8)
NRBC # BLD: 0 K/UL (ref 0–0.2)
P-R INTERVAL, ECG05: 134 MS
PLATELET # BLD AUTO: 253 K/UL (ref 150–450)
PMV BLD AUTO: 10.3 FL (ref 9.4–12.3)
POTASSIUM SERPL-SCNC: 4.2 MMOL/L (ref 3.5–5.1)
POTASSIUM SERPL-SCNC: 5.6 MMOL/L (ref 3.5–5.1)
PROT SERPL-MCNC: 9.1 G/DL (ref 6.3–8.2)
Q-T INTERVAL, ECG07: 354 MS
QRS DURATION, ECG06: 76 MS
QTC CALCULATION (BEZET), ECG08: 430 MS
RBC # BLD AUTO: 4.25 M/UL (ref 4.05–5.2)
SALICYLATES SERPL-MCNC: <1.7 MG/DL (ref 2.8–20)
SODIUM SERPL-SCNC: 139 MMOL/L (ref 136–145)
SODIUM SERPL-SCNC: 144 MMOL/L (ref 136–145)
TROPONIN I SERPL-MCNC: <0.02 NG/ML (ref 0.02–0.05)
VENTRICULAR RATE, ECG03: 89 BPM
WBC # BLD AUTO: 5.6 K/UL (ref 4.3–11.1)

## 2020-04-13 PROCEDURE — 93005 ELECTROCARDIOGRAM TRACING: CPT | Performed by: EMERGENCY MEDICINE

## 2020-04-13 PROCEDURE — 74011250636 HC RX REV CODE- 250/636: Performed by: EMERGENCY MEDICINE

## 2020-04-13 PROCEDURE — 80307 DRUG TEST PRSMV CHEM ANLYZR: CPT

## 2020-04-13 PROCEDURE — 83605 ASSAY OF LACTIC ACID: CPT

## 2020-04-13 PROCEDURE — 80053 COMPREHEN METABOLIC PANEL: CPT

## 2020-04-13 PROCEDURE — 84484 ASSAY OF TROPONIN QUANT: CPT

## 2020-04-13 PROCEDURE — 82010 KETONE BODYS QUAN: CPT

## 2020-04-13 PROCEDURE — 85027 COMPLETE CBC AUTOMATED: CPT

## 2020-04-13 PROCEDURE — 99283 EMERGENCY DEPT VISIT LOW MDM: CPT

## 2020-04-13 RX ORDER — SODIUM CHLORIDE 9 MG/ML
1000 INJECTION, SOLUTION INTRAVENOUS ONCE
Status: COMPLETED | OUTPATIENT
Start: 2020-04-13 | End: 2020-04-13

## 2020-04-13 RX ORDER — RANITIDINE 150 MG/1
150 TABLET, FILM COATED ORAL 2 TIMES DAILY
Qty: 20 TAB | Refills: 0 | Status: SHIPPED | OUTPATIENT
Start: 2020-04-13 | End: 2020-04-23

## 2020-04-13 RX ORDER — ONDANSETRON 4 MG/1
4 TABLET, ORALLY DISINTEGRATING ORAL
Qty: 11 TAB | Refills: 1 | Status: SHIPPED | OUTPATIENT
Start: 2020-04-13

## 2020-04-13 RX ADMIN — SODIUM CHLORIDE 1000 ML: 900 INJECTION, SOLUTION INTRAVENOUS at 14:31

## 2020-04-13 NOTE — ED TRIAGE NOTES
Pt c/o burning sensation in chest beginning this morning. Pt states \"feels like indigestion which I have a lot\" but wanted to make sure it wasn't different today. Denies nausea/vomiting, shortness of breath, radiating pain, and abdominal pain. Hx DVT/PE.

## 2020-04-13 NOTE — ED NOTES
I have reviewed discharge instructions with the patient. The patient verbalized understanding. Patient left ED via Discharge Method: ambulatory to Home with self. Opportunity for questions and clarification provided. Patient given 2 scripts. To continue your aftercare when you leave the hospital, you may receive an automated call from our care team to check in on how you are doing. This is a free service and part of our promise to provide the best care and service to meet your aftercare needs.  If you have questions, or wish to unsubscribe from this service please call [phone]. Thank you for Choosing our [facility] Emergency Department.

## 2020-04-13 NOTE — ED PROVIDER NOTES
[facility] Emergency Department  Arrival Date/Time: 4/13/2020 @  2:06 PM      Leta Hollingsworth  MRN: [id number]      64 y.o. female    YOB: 1960   [phone] (home)     [facility] EMERGENCY DEPT ERF/F  Seen on 4/14/2020 @ 2:26 PM      Today's Chief Complaint:   Chief Complaint   Patient presents with    Chest Pain     \"burning\"     HPI: 46-year-old female presents to the emergency department with burning chest pain. Onset several days ago. No alleviating. No aggravating. Decreased appetite. No nausea or vomiting. No diarrhea    She has a history of lupus. She is on multiple medications. Plaquenil was stopped last year secondary to eye problems    Feeling worse today       HPI    Review of Systems: Review of Systems   Constitutional: Positive for activity change and fatigue. Negative for chills and fever. HENT: Negative. Eyes: Negative. Respiratory: Negative. Cardiovascular: Negative. Gastrointestinal: Negative. Musculoskeletal: Negative. Skin: Negative. Neurological: Negative. Psychiatric/Behavioral: Negative. Past Medical History: Primary Care Doctor: Ha, MD Chad  Meds, PMH, PSHx, SocHx at end of this note     Allergies: Allergies   Allergen Reactions    Sulfa (Sulfonamide Antibiotics) Nausea and Vomiting         Key Anti-Platelet Anticoagulant Meds             warfarin (COUMADIN) 5 mg tablet (Taking) Take 5 mg by mouth daily. 7.5 on Wed, Fri        Physical Exam:  Nursing documentation reviewed. Patient Vitals for the past 24 hrs:   Temp Pulse Resp BP SpO2   04/13/20 1806  82 16 155/82 100 %   04/13/20 1727  73  (!) 182/104    04/13/20 1723  85  (!) 165/92    04/13/20 1720  96  151/88    04/13/20 1253 98.8 °F (37.1 °C) (!) 126 18 (!) 157/96 98 %    Vital signs were reviewed. Physical Exam  Vitals signs and nursing note reviewed. Constitutional:       General: She is not in acute distress.      Appearance: She is well-developed. She is not ill-appearing or toxic-appearing. HENT:      Head: Normocephalic and atraumatic. Eyes:      Pupils: Pupils are equal, round, and reactive to light. Neck:      Musculoskeletal: Normal range of motion. Cardiovascular:      Rate and Rhythm: Regular rhythm. Tachycardia present. Heart sounds: Normal heart sounds. Pulmonary:      Breath sounds: No decreased breath sounds or wheezing. Abdominal:      Palpations: Abdomen is soft. Tenderness: There is no abdominal tenderness. Musculoskeletal:      Right lower leg: She exhibits no tenderness. No edema. Left lower leg: She exhibits no tenderness. No edema. Skin:     General: Skin is warm. Capillary Refill: Capillary refill takes less than 2 seconds. Neurological:      Mental Status: She is alert and oriented to person, place, and time. MEDICAL DECISION MAKING:   Differential Diagnosis:    MDM  Number of Diagnoses or Management Options  Diagnosis management comments: 53-year-old female presents to the emergency department with chest pain burning sensation    She is noted to be tachycardic at 126    Labs were sent from triage which show a CO2 of 12. Creatinine normal.  Potassium is normal.  Glucose is normal    She has a history of lupus. Certainly could have a lupus nephritis or similar problem. We will hydrate her. Recheck her BMP after the first liter. Review other labs.        Amount and/or Complexity of Data Reviewed  Clinical lab tests: ordered  Decide to obtain previous medical records or to obtain history from someone other than the patient: yes  Review and summarize past medical records: yes    Risk of Complications, Morbidity, and/or Mortality  Presenting problems: moderate  Diagnostic procedures: minimal  Management options: moderate          Data/Management:    Lab findings during this visit:   Recent Results (from the past 48 hour(s))   CBC W/O DIFF    Collection Time: 04/13/20  1:01 PM Result Value Ref Range    WBC 5.6 4.3 - 11.1 K/uL    RBC 4.25 4.05 - 5.2 M/uL    HGB 13.1 11.7 - 15.4 g/dL    HCT 40.9 35.8 - 46.3 %    MCV 96.2 79.6 - 97.8 FL    MCH 30.8 26.1 - 32.9 PG    MCHC 32.0 31.4 - 35.0 g/dL    RDW 12.5 11.9 - 14.6 %    PLATELET 180 444 - 081 K/uL    MPV 10.3 9.4 - 12.3 FL    ABSOLUTE NRBC 0.00 0.0 - 0.2 K/uL   METABOLIC PANEL, COMPREHENSIVE    Collection Time: 04/13/20  1:01 PM   Result Value Ref Range    Sodium 139 136 - 145 mmol/L    Potassium 5.6 (H) 3.5 - 5.1 mmol/L    Chloride 108 (H) 98 - 107 mmol/L    CO2 12 (L) 21 - 32 mmol/L    Anion gap 19 (H) 7 - 16 mmol/L    Glucose 92 65 - 100 mg/dL    BUN 17 6 - 23 MG/DL    Creatinine 0.98 0.6 - 1.0 MG/DL    GFR est AA >60 >60 ml/min/1.73m2    GFR est non-AA >60 >60 ml/min/1.73m2    Calcium 9.3 8.3 - 10.4 MG/DL    Bilirubin, total 0.3 0.2 - 1.1 MG/DL    ALT (SGPT) 26 12 - 65 U/L    AST (SGOT) 56 (H) 15 - 37 U/L    Alk.  phosphatase 60 50 - 136 U/L    Protein, total 9.1 (H) 6.3 - 8.2 g/dL    Albumin 2.9 (L) 3.5 - 5.0 g/dL    Globulin 6.2 (H) 2.3 - 3.5 g/dL    A-G Ratio 0.5 (L) 1.2 - 3.5     TROPONIN I    Collection Time: 04/13/20  1:01 PM   Result Value Ref Range    Troponin-I, Qt. <0.02 (L) 0.02 - 0.05 NG/ML   EKG, 12 LEAD, INITIAL    Collection Time: 04/13/20  2:20 PM   Result Value Ref Range    Ventricular Rate 89 BPM    Atrial Rate 89 BPM    P-R Interval 134 ms    QRS Duration 76 ms    Q-T Interval 354 ms    QTC Calculation (Bezet) 430 ms    Calculated P Axis 71 degrees    Calculated R Axis 77 degrees    Calculated T Axis 12 degrees    Diagnosis       Normal sinus rhythm  Nonspecific T wave abnormality  Abnormal ECG  When compared with ECG of 15-NOV-2019 16:04,  Inverted T waves have replaced nonspecific T wave abnormality in Inferior   leads  Nonspecific T wave abnormality now evident in Lateral leads  Confirmed by Agustin Duane MD (), JOSÉ RUELAS ([id number]) on 4/13/2020 1:36:35 PM     METABOLIC PANEL, BASIC    Collection Time: 04/13/20  5:00 PM   Result Value Ref Range    Sodium 144 136 - 145 mmol/L    Potassium 4.2 3.5 - 5.1 mmol/L    Chloride 114 (H) 98 - 107 mmol/L    CO2 24 21 - 32 mmol/L    Anion gap 6 (L) 7 - 16 mmol/L    Glucose 88 65 - 100 mg/dL    BUN 11 6 - 23 MG/DL    Creatinine 0.82 0.6 - 1.0 MG/DL    GFR est AA >60 >60 ml/min/1.73m2    GFR est non-AA >60 >60 ml/min/1.73m2    Calcium 9.3 8.3 - 10.4 MG/DL   ACETAMINOPHEN    Collection Time: 04/13/20  5:00 PM   Result Value Ref Range    Acetaminophen level <2 (L) 10.0 - 30.0 ug/mL   BETA-HYDROXYBUTYRATE    Collection Time: 04/13/20  5:00 PM   Result Value Ref Range    B-hydroxybutyrate 0.20 0.05 - 0.27 mmol/L   LACTIC ACID    Collection Time: 04/13/20  5:00 PM   Result Value Ref Range    Lactic acid 0.9 0.4 - 2.0 MMOL/L   SALICYLATE    Collection Time: 04/13/20  5:00 PM   Result Value Ref Range    Salicylate level <3.9 (L) 2.8 - 20.0 MG/DL     Radiology studies during this visit: No results found. Medications given in the ED:   Medications   0.9% sodium chloride infusion 1,000 mL (0 mL IntraVENous IV Completed 4/13/20 0561)     Recheck and Additional Documentation:  (use .addrecheck  . addsepsis   . addstroke   . addhip  . addhandoff  . addcctime)     eval of AGMA  G  glycols       ---  no hx of ingestion  O  oxoproline, --- no tylenol  L  L-lactate,    ---- will add lactic to next labs  D      M  methanol    --- no ingestio   A  aspirin         --- add to labs  R  renal failure --- BUN/Cr are normal  K  ketoacidosis, --- pt does not appear to be starving. No reported vomiting/ diarrhea    --Uncertain etiology of her metabolic acidosis. Repeat BMP is pending. We will add a lactate as well as Tylenol and salicylate levels. Procedure Documentation:   Procedures     Other ED Course Notes:      I wore appropriate PPE throughout this patient's ED visit. Assessment and Plan:    Impression:     ICD-10-CM ICD-9-CM   1. Chest pain, unspecified type R07.9 786.50   2.  Metabolic [facility] E87.2 276.2     Disposition:    Follow-up:   Follow-up Information     Follow up With Specialties Details Why [facility] EMERGENCY DEPT Emergency Medicine  Come back to the ED if you get worse or develop any new problems [address] [place] [address] [facility] Med:   Discharge Medication List as of 4/13/2020  5:52 PM      START taking these medications    Details   ondansetron (Zofran ODT) 4 mg disintegrating tablet Take 1 Tab by mouth every eight (8) hours as needed for Nausea. , Print, Disp-11 Tab, R-1      raNITIdine (Zantac) 150 mg tablet Take 1 Tab by mouth two (2) times a day for 10 days. , Print, Disp-20 Tab, R-0         CONTINUE these medications which have NOT CHANGED    Details   lisinopril (PRINIVIL, ZESTRIL) 5 mg tablet Take  by mouth daily. , Historical Med      loratadine (CLARITIN) 10 mg tablet Take 10 mg by mouth daily. , Historical Med      amLODIPine (NORVASC) 5 mg tablet Take 5 mg by mouth daily. , Historical Med      warfarin (COUMADIN) 5 mg tablet Take 5 mg by mouth daily. 7.5 on Wed, Fri, Historical Med      KLOR-CON 10 10 mEq tablet Take 20 mEq by mouth daily. Pt takes one tab daily except Wed and Sunday. Pt takes 0.5 tab on Sun  , Historical Med      promethazine (PHENERGAN) 25 mg tablet Take 1 Tab by mouth every six (6) hours as needed for Nausea. , Print, Disp-12 Tab, R-0      cyclobenzaprine (FLEXERIL) 5 mg tablet Take 2 Tabs by mouth three (3) times daily as needed (pain). , Print, Disp-20 Tab, R-0      Comp Stocking,Knee,Regular,Med misc 2 Units by Does Not Apply route daily. , Print, Disp-2 Units, R-0      albuterol (PROVENTIL HFA, VENTOLIN HFA, PROAIR HFA) 90 mcg/actuation inhaler Take 2 Puffs by inhalation every six (6) hours as needed for Wheezing., Print, Disp-1 Inhaler, R-0      dexamethasone (DECADRON) 4 mg tablet Take 1 tab every 8 hours for the next 3 days. , Print, Disp-9 Tab, R-0      omeprazole (PRILOSEC) 40 mg capsule Take 1 Cap by mouth daily. , Print, Disp-30 Cap, R-0      budesonide (RHINOCORT AQUA) 32 mcg/actuation nasal spray 2 Sprays by Both Nostrils route.  , Historical Med             Past Medical History:      Past Medical History:   Diagnosis Date    Arthritis     Autoimmune disease ([address]     lupus    DVT (deep venous thrombosis) (HCC)     to R LE    Esophageal stricture     Gastroesophageal reflux disease     Hypertension     Other ill-defined conditions(799.89)     lupus     Past Surgical History:   Procedure Laterality Date    HX GI      esopheagus stretched x2     Social History     Tobacco Use    Smoking status: Never Smoker    Smokeless tobacco: Never Used   Substance Use Topics    Alcohol use: No    Drug use: No     Prior to Admission Medications   Prescriptions Last Dose Informant Patient Reported? Taking? Comp Stocking,Knee,Regular,Med misc   No No   Si Units by Does Not Apply route daily. KLOR-CON 10 10 mEq tablet  Self Yes Yes   Sig: Take 20 mEq by mouth daily. Pt takes one tab daily except Wed and . Pt takes 0.5 tab on Sun     albuterol (PROVENTIL HFA, VENTOLIN HFA, PROAIR HFA) 90 mcg/actuation inhaler   No No   Sig: Take 2 Puffs by inhalation every six (6) hours as needed for Wheezing. amLODIPine (NORVASC) 5 mg tablet   Yes Yes   Sig: Take 5 mg by mouth daily. budesonide (RHINOCORT AQUA) 32 mcg/actuation nasal spray   Yes No   Si Sprays by Both Nostrils route. cyclobenzaprine (FLEXERIL) 5 mg tablet   No No   Sig: Take 2 Tabs by mouth three (3) times daily as needed (pain). dexamethasone (DECADRON) 4 mg tablet   No No   Sig: Take 1 tab every 8 hours for the next 3 days. lisinopril (PRINIVIL, ZESTRIL) 5 mg tablet   Yes Yes   Sig: Take  by mouth daily. loratadine (CLARITIN) 10 mg tablet   Yes Yes   Sig: Take 10 mg by mouth daily. omeprazole (PRILOSEC) 40 mg capsule   No No   Sig: Take 1 Cap by mouth daily.    promethazine (PHENERGAN) 25 mg tablet   No No   Sig: Take 1 Tab by mouth every six (6) hours as needed for Nausea. warfarin (COUMADIN) 5 mg tablet  Self Yes Yes   Sig: Take 5 mg by mouth daily.  7.5 on Wed, Fri      Facility-Administered Medications: None

## 2020-04-14 LAB — B-OH-BUTYR SERPL-SCNC: 0.2 MMOL/L (ref 0.05–0.27)

## 2020-04-15 ENCOUNTER — HOSPITAL ENCOUNTER (EMERGENCY)
Age: 60
Discharge: HOME | End: 2020-04-15
Attending: EMERGENCY MEDICINE
Payer: SELF-PAY

## 2020-04-15 VITALS
SYSTOLIC BLOOD PRESSURE: 160 MMHG | RESPIRATION RATE: 16 BRPM | TEMPERATURE: 98.4 F | DIASTOLIC BLOOD PRESSURE: 97 MMHG | HEART RATE: 101 BPM | OXYGEN SATURATION: 98 %

## 2020-04-15 DIAGNOSIS — Z01.419 WELL WOMAN EXAM: Primary | ICD-10-CM

## 2020-04-15 PROCEDURE — 99282 EMERGENCY DEPT VISIT SF MDM: CPT

## 2020-04-15 NOTE — ED TRIAGE NOTES
Pt reports uvula pain. Pt just noticed it this morning. States she can still swallow and eat food and breathe without difficulty. Pt states this may be normal for her but she doesn't know. In mask in triage. Denies fevers. , speaking in clear sentences

## 2020-04-15 NOTE — ED PROVIDER NOTES
Patient states she was looking in the back of her throat today and thought that her uvula may be hanging down a little bit farther than normal.  She is not having any difficulty swallowing, fever, nausea, vomiting, headache, visual changes, neck pain, ear pain, dental pain or other new symptoms. She did ambulate to the room without difficulty and is well-hydrated. She is a patient at the [facility]. The history is provided by the patient. Skin Problem    This is a new problem. The current episode started 1 to 2 hours ago. The problem has not changed since onset. The problem is associated with nothing. There has been no fever. Affected Location: Mouth. The pain is at a severity of 0/10. The patient is experiencing no pain. Pertinent negatives include no blisters, no itching, no pain and no hives. She has tried nothing for the symptoms. Past Medical History:   Diagnosis Date    Arthritis     Autoimmune disease ([facility] [address]     lupus    DVT (deep venous thrombosis) ([facility])     to R LE    Esophageal stricture     Gastroesophageal reflux disease     Hypertension     Other ill-defined conditions(799.89)     lupus       Past Surgical History:   Procedure Laterality Date    HX GI      esopheagus stretched x2         History reviewed. No pertinent family history.     Social History     Socioeconomic History    Marital status:      Spouse name: Not on file    Number of children: Not on file    Years of education: Not on file    Highest education level: Not on file   Occupational History    Not on file   Social Needs    Financial resource strain: Not on file    Food insecurity     Worry: Not on file     Inability: Not on file    Transportation needs     Medical: Not on file     Non-medical: Not on file   Tobacco Use    Smoking status: Never Smoker    Smokeless tobacco: Never Used   Substance and Sexual Activity    Alcohol use: No    Drug use: No    Sexual activity: Yes     Partners: Male Birth control/protection: None   Lifestyle    Physical activity     Days per week: Not on file     Minutes per session: Not on file    Stress: Not on file   Relationships    Social connections     Talks on phone: Not on file     Gets together: Not on file     Attends Jain service: Not on file     Active member of club or organization: Not on file     Attends meetings of clubs or organizations: Not on file     Relationship status: Not on file    Intimate partner violence     Fear of current or ex partner: Not on file     Emotionally abused: Not on file     Physically abused: Not on file     Forced sexual activity: Not on file   Other Topics Concern    Not on file   Social History Narrative    Not on file         ALLERGIES: Sulfa (sulfonamide antibiotics)    Review of Systems   Constitutional: Negative. Negative for chills and fever. HENT: Negative. Negative for congestion, sinus pressure and sore throat. Eyes: Negative. Respiratory: Negative. Cardiovascular: Negative. Gastrointestinal: Negative. Genitourinary: Negative. Musculoskeletal: Negative. Skin: Negative. Negative for itching. Neurological: Negative. Psychiatric/Behavioral: Negative. All other systems reviewed and are negative. Vitals:    04/15/20 1457 04/15/20 1510   BP: (!) 160/97    Pulse: (!) 101    Resp: 16    Temp: 98.4 °F (36.9 °C)    SpO2: 97% 98%            Physical Exam  Vitals signs and nursing note reviewed. Constitutional:       Appearance: She is well-developed. HENT:      Head: Normocephalic and atraumatic. Right Ear: Tympanic membrane, ear canal and external ear normal.      Left Ear: Tympanic membrane, ear canal and external ear normal.      Nose: Nose normal. No congestion or rhinorrhea. Mouth/Throat:      Mouth: Mucous membranes are moist. No oral lesions. Pharynx: Oropharynx is clear. Uvula midline.  No pharyngeal swelling, oropharyngeal exudate, posterior oropharyngeal erythema or uvula swelling. Eyes:      Conjunctiva/sclera: Conjunctivae normal.      Pupils: Pupils are equal, round, and reactive to light. Neck:      Musculoskeletal: Normal range of motion and neck supple. Cardiovascular:      Rate and Rhythm: Normal rate and regular rhythm. Heart sounds: Normal heart sounds. Pulmonary:      Effort: Pulmonary effort is normal.      Breath sounds: Normal breath sounds. Abdominal:      General: Bowel sounds are normal.      Palpations: Abdomen is soft. Musculoskeletal: Normal range of motion. Skin:     General: Skin is warm and dry. Neurological:      Mental Status: She is alert and oriented to person, place, and time. Deep Tendon Reflexes: Reflexes are normal and symmetric. Psychiatric:         Behavior: Behavior normal.         Thought Content: Thought content normal.         Judgment: Judgment normal.          MDM  Number of Diagnoses or Management Options  Well woman exam:   Risk of Complications, Morbidity, and/or Mortality  Presenting problems: low  Diagnostic procedures: low  Management options: low    Patient Progress  Patient progress: stable         Procedures    The patient was observed in the ED. Patient has no deformity on exam and her vital signs are normal.  She was reassured today regarding this and told to return if she has any problems. She should drink plenty of fluids and is stable for discharge at this time. She is ambulatory out of the ER without difficulty. I discussed the results of all labs, procedures, radiographs, and treatments with the patient and available family. Treatment plan is agreed upon and the patient is ready for discharge. All voiced understanding of the discharge plan and medication instructions or changes as appropriate. Questions about treatment in the ED were answered. All were encouraged to return should symptoms worsen or new problems develop.

## 2020-04-15 NOTE — ED NOTES
I have reviewed discharge instructions with the patient. The patient verbalized understanding. Patient left ED via Discharge Method: ambulatory to Home with (self). Opportunity for questions and clarification provided. Patient given 0 scripts. To continue your aftercare when you leave the hospital, you may receive an automated call from our care team to check in on how you are doing. This is a free service and part of our promise to provide the best care and service to meet your aftercare needs.  If you have questions, or wish to unsubscribe from this service please call [phone]. Thank you for Choosing our [employer] Emergency Department.

## 2020-05-03 ENCOUNTER — HOSPITAL ENCOUNTER (EMERGENCY)
Age: 60
Discharge: HOME | End: 2020-05-03
Attending: EMERGENCY MEDICINE
Payer: SELF-PAY

## 2020-05-03 ENCOUNTER — APPOINTMENT (OUTPATIENT)
Dept: GENERAL RADIOLOGY | Age: 60
End: 2020-05-03
Attending: EMERGENCY MEDICINE
Payer: SELF-PAY

## 2020-05-03 ENCOUNTER — HOSPITAL ENCOUNTER (EMERGENCY)
Age: 60
Discharge: STILL ADMITTED OR ADMINISTRATIVE | End: 2020-05-03

## 2020-05-03 VITALS
RESPIRATION RATE: 16 BRPM | OXYGEN SATURATION: 100 % | HEIGHT: 62 IN | WEIGHT: 135 LBS | HEART RATE: 70 BPM | BODY MASS INDEX: 24.84 KG/M2 | DIASTOLIC BLOOD PRESSURE: 83 MMHG | SYSTOLIC BLOOD PRESSURE: 141 MMHG | TEMPERATURE: 97.8 F

## 2020-05-03 DIAGNOSIS — M25.561 ACUTE PAIN OF RIGHT KNEE: Primary | ICD-10-CM

## 2020-05-03 PROCEDURE — 73562 X-RAY EXAM OF KNEE 3: CPT

## 2020-05-03 PROCEDURE — 99283 EMERGENCY DEPT VISIT LOW MDM: CPT

## 2020-05-03 NOTE — ED PROVIDER NOTES
Patient is a 61-year-old female who presents with pain in her right knee with walking. Patient states \"it crunches\" when she walks. She denies any pain in her lower legs or calves, no swelling at this time is although states sometimes will swell bilaterally. No pain behind the knee, states the pain is only in the front and sides of the knee and only with walking and range of motion. No injury. No chest pain, no shortness of breath, no further complaints. Overall patient is very well-appearing, no acute distress. Past Medical History:   Diagnosis Date    Arthritis     Autoimmune disease ([facility] [address]     lupus    DVT (deep venous thrombosis) ([facility])     to R LE    Esophageal stricture     Gastroesophageal reflux disease     Hypertension     Other ill-defined conditions(799.89)     lupus       Past Surgical History:   Procedure Laterality Date    HX GI      esopheagus stretched x2         No family history on file.     Social History     Socioeconomic History    Marital status:      Spouse name: Not on file    Number of children: Not on file    Years of education: Not on file    Highest education level: Not on file   Occupational History    Not on file   Social Needs    Financial resource strain: Not on file    Food insecurity     Worry: Not on file     Inability: Not on file    Transportation needs     Medical: Not on file     Non-medical: Not on file   Tobacco Use    Smoking status: Never Smoker    Smokeless tobacco: Never Used   Substance and Sexual Activity    Alcohol use: No    Drug use: No    Sexual activity: Yes     Partners: Male     Birth control/protection: None   Lifestyle    Physical activity     Days per week: Not on file     Minutes per session: Not on file    Stress: Not on file   Relationships    Social connections     Talks on phone: Not on file     Gets together: Not on file     Attends Druze service: Not on file     Active member of club or organization: Not on file     Attends meetings of clubs or organizations: Not on file     Relationship status: Not on file    Intimate partner violence     Fear of current or ex partner: Not on file     Emotionally abused: Not on file     Physically abused: Not on file     Forced sexual activity: Not on file   Other Topics Concern    Not on file   Social History Narrative    Not on file         ALLERGIES: Sulfa (sulfonamide antibiotics)    Review of Systems   Constitutional: Negative for chills and fever. HENT: Negative for rhinorrhea and sore throat. Eyes: Negative for visual disturbance. Respiratory: Negative for cough and shortness of breath. Cardiovascular: Negative for chest pain and leg swelling. Gastrointestinal: Negative for abdominal pain, diarrhea, nausea and vomiting. Genitourinary: Negative for dysuria. Musculoskeletal: Positive for arthralgias. Negative for back pain and neck pain. Skin: Negative for rash. Neurological: Negative for weakness and headaches. Psychiatric/Behavioral: The patient is not nervous/anxious. Vitals:    05/03/20 1936   BP: (!) 165/91   Pulse: 74   Resp: 18   Temp: 98.2 °F (36.8 °C)   SpO2: 100%   Weight: 61.2 kg (135 lb)   Height: 5' 2\" (1.575 m)            Physical Exam  Vitals signs and nursing note reviewed. Constitutional:       Appearance: She is well-developed. HENT:      Head: Normocephalic. Right Ear: External ear normal.      Left Ear: External ear normal.   Eyes:      Conjunctiva/sclera: Conjunctivae normal.      Pupils: Pupils are equal, round, and reactive to light. Neck:      Musculoskeletal: Normal range of motion and neck supple. Trachea: No tracheal deviation. Cardiovascular:      Rate and Rhythm: Normal rate and regular rhythm. Heart sounds: Normal heart sounds. No murmur. Pulmonary:      Effort: Pulmonary effort is normal. No respiratory distress. Breath sounds: Normal breath sounds.    Abdominal:      Palpations: Abdomen is soft. Tenderness: There is no abdominal tenderness. Musculoskeletal: Normal range of motion. General: Swelling and tenderness present. Comments: There is mild swelling of the right knee anteriorly with slight effusion noted and some pain to palpation along the joint lines bilaterally and with range of motion. There is no swelling of her lower legs bilaterally, no pain to palpation behind the knee in the popliteal fossa or swelling noted, dorsalis pedis pulses 2+ bilaterally. She has full range of motion and is ambulatory with some pain noted. Skin:     Findings: No rash. Neurological:      Mental Status: She is alert and oriented to person, place, and time. Cranial Nerves: No cranial nerve deficit. MDM  Number of Diagnoses or Management Options     Amount and/or Complexity of Data Reviewed  Tests in the radiology section of CPT®: ordered and reviewed  Review and summarize past medical records: yes    Risk of Complications, Morbidity, and/or Mortality  Presenting problems: moderate  Diagnostic procedures: moderate  Management options: moderate    Patient Progress  Patient progress: stable         Procedures    Xr Knee Rt 3 V    Result Date: 5/3/2020  Exam:  Right knee radiographs History:  pain, swelling, 64 years Female Comparison: None available Findings:  No evidence of acute fracture or dislocation. Mild medial compartment joint space narrowing. There appears to be a chronic healed fracture deformity of the right proximal fibular metadiaphysis. Normal alignment. Mild diffuse osteopenia. Possible small knee joint effusion. Visualized soft tissues otherwise unremarkable. Impression:  No evidence of acute osseous injury. Other incidental findings as above. 32-year-old female with right knee pain:    Symptoms, exam and xray consistent with degenerative joint disease.  No swelling of leg or posterior pain to suggest DVT or further acute process at this time. Will place on crutches and discussed continued leg elevation and to follow up with PCP/ortho for further imaging and workup or return with any worsening pain or swelling or any further concerns.

## 2020-05-03 NOTE — ED TRIAGE NOTES
Pt arrived via POV ambulatory to triage c/o right knee pain. Pt states \"it crunches when I walk. \" reports that it has been swelling for the past two days. Denies injury, falls, and meds pta. States that she has swelling to her ankles at times and thinks it is due to her BP meds or lupus.

## 2020-05-04 NOTE — ED NOTES
I have reviewed discharge instructions with the patient. The patient verbalized understanding. Patient left ED via Discharge Method: ambulatory to Home via POV. Opportunity for questions and clarification provided. Patient given 0 scripts. To continue your aftercare when you leave the hospital, you may receive an automated call from our care team to check in on how you are doing. This is a free service and part of our promise to provide the best care and service to meet your aftercare needs.  If you have questions, or wish to unsubscribe from this service please call [phone]. Thank you for Choosing our [facility] Emergency Department.

## 2021-01-12 ENCOUNTER — HOSPITAL ENCOUNTER (OUTPATIENT)
Dept: LAB | Age: 61
Discharge: HOME | End: 2021-01-12
Payer: COMMERCIAL

## 2021-01-12 PROCEDURE — 87624 HPV HI-RISK TYP POOLED RSLT: CPT

## 2021-01-12 PROCEDURE — 88142 CYTOPATH C/V THIN LAYER: CPT

## 2021-01-28 LAB
Lab: NORMAL
REFERENCE LAB,REFLB: NORMAL
TEST DESCRIPTION:,ATST: NORMAL

## 2021-03-27 ENCOUNTER — HOSPITAL ENCOUNTER (EMERGENCY)
Age: 61
Discharge: HOME | End: 2021-03-27
Attending: EMERGENCY MEDICINE
Payer: COMMERCIAL

## 2021-03-27 VITALS
DIASTOLIC BLOOD PRESSURE: 87 MMHG | HEART RATE: 76 BPM | SYSTOLIC BLOOD PRESSURE: 141 MMHG | TEMPERATURE: 97.7 F | OXYGEN SATURATION: 100 % | RESPIRATION RATE: 16 BRPM

## 2021-03-27 DIAGNOSIS — Z00.00 WELL ADULT HEALTH CHECK: Primary | ICD-10-CM

## 2021-03-27 PROCEDURE — 99282 EMERGENCY DEPT VISIT SF MDM: CPT

## 2021-03-28 NOTE — DISCHARGE INSTRUCTIONS
Take some Benadryl or use ice over the affected area if it is bothering you. If you start having any concerning signs for allergic reaction return to the emergency department.

## 2021-03-28 NOTE — ED PROVIDER NOTES
Patient is a 21-year-old female presenting to the emergency department today because of concern over stepping on a black bumblebee. The patient states she was having her pressures and she felt like something may have pricked the bottom of her right foot. The patient noticed there was a bumblebee next to her bed which was dead. She says she not having any difficulty breathing or shortness of breath and denies any palpitations or tachycardia. Past Medical History:   Diagnosis Date    Arthritis     Autoimmune disease ([facility] [address]     lupus    DVT [facility]deep [facility] thrombosis) ([facility])     to R LE    Esophageal stricture     Gastroesophageal reflux disease     Hypertension     Other ill-defined conditions(799.89)     lupus       Past Surgical History:   Procedure Laterality Date    HX GI      esopheagus stretched x2         No family history on file.     Social History     Socioeconomic History    Marital status:      Spouse name: Not on file    Number of children: Not on file    Years of education: Not on file    Highest education level: Not on file   Occupational History    Not on file   Social Needs    Financial resource strain: Not on file    Food insecurity     Worry: Not on file     Inability: Not on file    Transportation needs     Medical: Not on file     Non-medical: Not on file   Tobacco Use    Smoking status: Never Smoker    Smokeless tobacco: Never Used   Substance and Sexual Activity    Alcohol use: No    Drug use: No    Sexual activity: Yes     Partners: Male     Birth control/protection: None   Lifestyle    Physical activity     Days per week: Not on file     Minutes per session: Not on file    Stress: Not on file   Relationships    Social connections     Talks on phone: Not on file     Gets together: Not on file     Attends Confucianism service: Not on file     Active member of club or organization: Not on file     Attends meetings of clubs or organizations: Not on file Relationship status: Not on file    Intimate partner violence     Fear of current or ex partner: Not on file     Emotionally abused: Not on file     Physically abused: Not on file     Forced sexual activity: Not on file   Other Topics Concern    Not on file   Social History Narrative    Not on file         ALLERGIES: Sulfa (sulfonamide antibiotics)    Review of Systems   Constitutional: Negative. Musculoskeletal: Negative. Skin: Negative. Allergic/Immunologic: Negative. Neurological: Negative. Hematological: Negative. Vitals:    03/27/21 2200   BP: (!) 141/87   Pulse: 76   Resp: 16   Temp: 97.7 °F (36.5 °C)   SpO2: 100%            Physical Exam     GENERAL:The patient has There is no height or weight on file to calculate BMI. Well-hydrated. No acute distress. VITAL SIGNS: Heart rate, blood pressure, respiratory rate reviewed as recorded in  nurse's notes  EYES: Pupils reactive. Extraocular motion intact. No conjunctival redness or drainage. NOSE: No nasal drainage or epistaxis. MOUTH/THROAT: Pharynx clear; airway patent. Floor the mouth is soft  NECK: Supple, no meningeal signs. Trachea midline. No masses or thyromegaly. LUNGS: Breath sounds clear and equal bilaterally no accessory muscle use  CARDIOVASCULAR: Regular rate and rhythm  EXTREMITIES: No clubbing or cyanosis. No joint swelling. Normal muscle tone. No  restricted range of motion appreciated. NEUROLOGIC: Sensation is grossly intact. Cranial nerve exam reveals face is  symmetrical, tongue is midline speech is clear. SKIN: No rash or petechiae. Good skin turgor palpated. No evidence of a insect sting or a stinger in the sole of the patient's foot. PSYCHIATRIC: Alert and oriented. Appropriate behavior and judgment.       MDM  Number of Diagnoses or Management Options  Diagnosis management comments: Onsite bite, allergic reaction,         Procedures

## 2021-03-28 NOTE — ED NOTES
I have reviewed discharge instructions with the patient. The patient verbalized understanding. Patient left ED via Discharge Method: ambulatory to Home with self. Opportunity for questions and clarification provided. Patient given 0 scripts. To continue your aftercare when you leave the hospital, you may receive an automated call from our care team to check in on how you are doing. This is a free service and part of our promise to provide the best care and service to meet your aftercare needs.  If you have questions, or wish to unsubscribe from this service please call [phone]. Thank you for Choosing our [address] [place] [address] Emergency Department.

## 2021-03-28 NOTE — ED TRIAGE NOTES
Pt arrives ambulatory because she stepped on a bee and thinks it stung her. No noticeable redness to foot. Pt states \"I think I felt a prick, it doesn't hurt though. \" Pt states she found a dead bee on her floor later on.

## 2021-05-10 ENCOUNTER — HOSPITAL ENCOUNTER (EMERGENCY)
Age: 61
Discharge: HOME | End: 2021-05-10
Attending: EMERGENCY MEDICINE
Payer: COMMERCIAL

## 2021-05-10 VITALS
DIASTOLIC BLOOD PRESSURE: 74 MMHG | WEIGHT: 141 LBS | RESPIRATION RATE: 16 BRPM | BODY MASS INDEX: 25.95 KG/M2 | HEART RATE: 66 BPM | SYSTOLIC BLOOD PRESSURE: 122 MMHG | HEIGHT: 62 IN | TEMPERATURE: 98.3 F | OXYGEN SATURATION: 98 %

## 2021-05-10 DIAGNOSIS — R10.9 ABDOMINAL CRAMPING: Primary | ICD-10-CM

## 2021-05-10 LAB
BACTERIA URNS QL MICRO: 0 /HPF
CASTS URNS QL MICRO: 0 /LPF
CRYSTALS URNS QL MICRO: 0 /LPF
EPI CELLS #/AREA URNS HPF: NORMAL /HPF
MUCOUS THREADS URNS QL MICRO: 0 /LPF
RBC #/AREA URNS HPF: NORMAL /HPF
WBC URNS QL MICRO: NORMAL /HPF

## 2021-05-10 PROCEDURE — 81003 URINALYSIS AUTO W/O SCOPE: CPT

## 2021-05-10 PROCEDURE — 99284 EMERGENCY DEPT VISIT MOD MDM: CPT

## 2021-05-10 PROCEDURE — 81015 MICROSCOPIC EXAM OF URINE: CPT

## 2021-05-10 RX ORDER — HYOSCYAMINE SULFATE 0.12 MG/1
0.12 TABLET SUBLINGUAL
Qty: 20 TAB | Refills: 0 | Status: SHIPPED | OUTPATIENT
Start: 2021-05-10

## 2021-05-10 NOTE — ED TRIAGE NOTES
Pt ambulatory to triage with CO intermittent lower abdominal cramping since yesterday evening after eating out. Denies diarrhea, reports consitpation. Reports blood when she wipes.  Denies NV

## 2021-05-11 NOTE — ED NOTES
I have reviewed discharge instructions with the patient. The patient verbalized understanding. Patient left ED via Discharge Method: ambulatory to Home with self transport. The patient is ambulatory upon exit and appears in no acute distress. The patient has been provided discharge instructions, prescription, and follow up information. Opportunity for questions and clarification provided. Patient given 1 scripts. To continue your aftercare when you leave the hospital, you may receive an automated call from our care team to check in on how you are doing. This is a free service and part of our promise to provide the best care and service to meet your aftercare needs.  If you have questions, or wish to unsubscribe from this service please call [phone]. Thank you for Choosing our [facility] Emergency Department.

## 2021-05-11 NOTE — ED PROVIDER NOTES
51-year-old female who presented to the emergency department today with complaint of generalized abdominal cramping. She reported symptoms began last night after eating some spicy chicken. She reports symptoms improved but today she had a leftover chicken for lunch and symptoms began again. She reports some tenderness have currently resolved. She denies any nausea, vomiting, diarrhea, fever, chills, shortness of breath, or chest pain. The history is provided by the patient. Abdominal Cramping   This is a new problem. The pain is located in the generalized abdominal region. Pertinent negatives include no fever, no belching, no diarrhea, no flatus, no hematochezia, no melena, no nausea, no vomiting, no constipation, no dysuria, no frequency, no hematuria, no headaches, no arthralgias, no myalgias, no trauma and no back pain. Nothing worsens the pain. Past Medical History:   Diagnosis Date    Arthritis     Autoimmune disease ([facility] [address]     lupus    [facility]) ([facility])     to R LE    Esophageal stricture     Gastroesophageal reflux disease     Hypertension     Other ill-defined conditions(799.89)     lupus       Past Surgical History:   Procedure Laterality Date    HX GI      esopheagus stretched x2         No family history on file.     Social History     Socioeconomic History    Marital status:      Spouse name: Not on file    Number of children: Not on file    Years of education: Not on file    Highest education level: Not on file   Occupational History    Not on file   Social Needs    Financial resource strain: Not on file    Food insecurity     Worry: Not on file     Inability: Not on file    Transportation needs     Medical: Not on file     Non-medical: Not on file   Tobacco Use    Smoking status: Never Smoker    Smokeless tobacco: Never Used   Substance and Sexual Activity    Alcohol use: No    Drug use: No    Sexual activity: Yes     Partners: Male     Birth control/protection: None   Lifestyle    Physical activity     Days per week: Not on file     Minutes per session: Not on file    Stress: Not on file   Relationships    Social connections     Talks on phone: Not on file     Gets together: Not on file     Attends Episcopalian service: Not on file     Active member of club or organization: Not on file     Attends meetings of clubs or organizations: Not on file     Relationship status: Not on file    Intimate partner violence     Fear of current or ex partner: Not on file     Emotionally abused: Not on file     Physically abused: Not on file     Forced sexual activity: Not on file   Other Topics Concern    Not on file   Social History Narrative    Not on file         ALLERGIES: Sulfa (sulfonamide antibiotics)    Review of Systems   Constitutional: Negative for fever. Gastrointestinal: Positive for abdominal pain. Negative for constipation, diarrhea, flatus, hematochezia, melena, nausea and vomiting. Genitourinary: Negative for dysuria, frequency and hematuria. Musculoskeletal: Negative for arthralgias, back pain and myalgias. Neurological: Negative for headaches. All other systems reviewed and are negative. Vitals:    05/10/21 1800 05/10/21 2058   BP: 136/86 122/74   Pulse: 84 66   Resp: 16 16   Temp: 98.4 °F (36.9 °C) 98.3 °F (36.8 °C)   SpO2: 100% 98%   Weight: 64 kg (141 lb)    Height: 5' 2\" (1.575 m)             Physical Exam  Vitals signs and nursing note reviewed. Constitutional:       General: She is not in acute distress. Appearance: Normal appearance. She is normal weight. She is not ill-appearing, toxic-appearing or diaphoretic. HENT:      Head: Normocephalic and atraumatic. Right Ear: External ear normal.      Left Ear: External ear normal.      Nose: Nose normal.      Mouth/Throat:      Mouth: Mucous membranes are moist.      Pharynx: Oropharynx is clear. Eyes:      General: No scleral icterus.      Extraocular Movements: Extraocular movements intact. Conjunctiva/sclera: Conjunctivae normal.   Neck:      Musculoskeletal: Normal range of motion and neck supple. Cardiovascular:      Rate and Rhythm: Normal rate. Pulses: Normal pulses. Heart sounds: Normal heart sounds. Pulmonary:      Effort: Pulmonary effort is normal. No respiratory distress. Breath sounds: Normal breath sounds. Abdominal:      General: Abdomen is flat. Bowel sounds are normal.      Palpations: Abdomen is soft. Musculoskeletal: Normal range of motion. Right lower leg: No edema. Left lower leg: No edema. Skin:     General: Skin is warm and dry. Capillary Refill: Capillary refill takes less than 2 seconds. Neurological:      General: No focal deficit present. Mental Status: She is alert and oriented to person, place, and time. Psychiatric:         Mood and Affect: Mood normal.         Behavior: Behavior normal.          MDM  Number of Diagnoses or Management Options  Abdominal cramping  Diagnosis management comments: 57-year-old female who presented today with complaint of abdominal cramping. At time of exam she was asymptomatic. Due to her reports of symptom onset after eating spicy chicken it is believed this is what caused her symptoms. I have advised her to avoid spicy foods. Levsin prescription provided as needed. I encouraged her to follow-up with her primary care provider should her symptoms recur but to return to the ER should she have any new or worsening symptoms. Patient verbalizes understanding and agreement with treatment plan, follow-up, and discharge.     Risk of Complications, Morbidity, and/or Mortality  Presenting problems: low  Diagnostic procedures: low  Management options: low    Patient Progress  Patient progress: improved         Procedures

## 2021-05-11 NOTE — DISCHARGE INSTRUCTIONS
Avoid spicy foods. Take prescribed medication if needed for abdominal cramps. Return to the emergency department for any new, worsening, or concerning symptoms.

## 2021-06-19 ENCOUNTER — HOSPITAL ENCOUNTER (EMERGENCY)
Age: 61
Discharge: HOME | End: 2021-06-19
Attending: EMERGENCY MEDICINE

## 2021-06-19 VITALS
RESPIRATION RATE: 16 BRPM | SYSTOLIC BLOOD PRESSURE: 151 MMHG | DIASTOLIC BLOOD PRESSURE: 97 MMHG | TEMPERATURE: 98.7 F | OXYGEN SATURATION: 99 % | HEART RATE: 76 BPM

## 2021-06-19 DIAGNOSIS — M54.50 ACUTE LEFT-SIDED LOW BACK PAIN WITHOUT SCIATICA: Primary | ICD-10-CM

## 2021-06-19 PROCEDURE — 99282 EMERGENCY DEPT VISIT SF MDM: CPT

## 2021-06-19 NOTE — ED PROVIDER NOTES
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 She is a patient care provider. She took some Tylenol at home with some improvement of her symptoms. She has no other medical complaints. Denies saddle anesthesia, bladder/bowel incontinence. Past Medical History:   Diagnosis Date    Arthritis     Autoimmune disease ([address]     lupus    DVT (deep venous thrombosis) ([facility])     to R LE    Esophageal stricture     Gastroesophageal reflux disease     Hypertension     Other ill-defined conditions(799.89)     lupus       Past Surgical History:   Procedure Laterality Date    HX GI      esopheagus stretched x2         No family history on file. Social History     Socioeconomic History    Marital status:      Spouse name: Not on file    Number of children: Not on file    Years of education: Not on file    Highest education level: Not on file   Occupational History    Not on file   Tobacco Use    Smoking status: Never Smoker    Smokeless tobacco: Never Used   Substance and Sexual Activity    Alcohol use: No    Drug use: No    Sexual activity: Yes     Partners: Male     Birth control/protection: None   Other Topics Concern    Not on file   Social History Narrative    Not on file     Social Determinants of Health     Financial Resource Strain:     Difficulty of Paying Living Expenses:    Food Insecurity:     Worried About Running Out of Food in the Last Year:     [address] in the Last Year:    Transportation Needs:     Lack of Transportation (Medical):      Lack of Transportation (Non-Medical):    Physical Activity:     Days of Exercise per Week:     Minutes of Exercise per Session:    Stress:     Feeling of Stress :    Social Connections:     Frequency of Communication with Friends and Family:     Frequency of Social Gatherings with Friends and Family:     Attends Jainism Services:     Active Member of Clubs or Organizations:     Attends Club or Organization Meetings:     Marital Status:    Intimate Partner Violence:     Fear of Current or Ex-Partner:     Emotionally Abused:     Physically Abused:     Sexually Abused: ALLERGIES: Sulfa (sulfonamide antibiotics)    Review of Systems   Constitutional: Negative for chills and fever. HENT: Negative for facial swelling. Respiratory: Negative for chest tightness and shortness of breath. Cardiovascular: Negative for chest pain. Gastrointestinal: Negative for abdominal pain, nausea and vomiting. Musculoskeletal: Positive for back pain. Negative for myalgias. Neurological: Negative for headaches. Psychiatric/Behavioral: Negative for confusion. All other systems reviewed and are negative. Vitals:    06/19/21 0948   BP: (!) 151/97   Pulse: 76   Resp: 16   Temp: 98.7 °F (37.1 °C)   SpO2: 99%            Physical Exam  Vitals and nursing note reviewed. Constitutional:       Appearance: Normal appearance. HENT:      Head: Normocephalic and atraumatic. Mouth/Throat:      Mouth: Mucous membranes are moist.   Eyes:      Pupils: Pupils are equal, round, and reactive to light. Cardiovascular:      Rate and Rhythm: Normal rate and regular rhythm. Pulmonary:      Effort: No respiratory distress. Breath sounds: Normal breath sounds. Abdominal:      Palpations: Abdomen is soft. Tenderness: There is no abdominal tenderness. There is no guarding. Musculoskeletal:      Cervical back: Normal.      Thoracic back: Normal. No swelling. Lumbar back: Normal.        Back:       Comments: Paraspinal tenderness on the left side of lumbar region   Skin:     General: Skin is warm and dry. Neurological:      Mental Status: She is alert and oriented to person, place, and time. Mental status is at baseline.    Psychiatric:         Mood and Affect: Mood normal.          MDM  Number of Diagnoses or Management Options  Diagnosis management comments: Patient has some muscle tenderness on the left side. This has improved with Tylenol at home. She has no saddle anesthesia, bladder/bowel incontinence. I have no concern for acute cord compression syndrome, dissecting AAA, will treat conservatively with Tylenol and ibuprofen at home instructed to follow-up with primary care. Patient verbalized understanding.        Amount and/or Complexity of Data Reviewed  Clinical lab tests: ordered and reviewed  Tests in the radiology section of CPT®: ordered and reviewed    Risk of Complications, Morbidity, and/or Mortality  Presenting problems: low  Diagnostic procedures: low  Management options: low           Procedures no

## 2021-06-19 NOTE — ED NOTES
I have reviewed discharge instructions with the patient. The patient verbalized understanding. Patient left ED via Discharge Method: ambulatory to Home with self  Opportunity for questions and clarification provided. Patient given 0 scripts. Pt in no acute distress at time of d/c        To continue your aftercare when you leave the hospital, you may receive an automated call from our care team to check in on how you are doing. This is a free service and part of our promise to provide the best care and service to meet your aftercare needs.  If you have questions, or wish to unsubscribe from this service please call [phone]. Thank you for Choosing our [facility] Emergency Department.

## 2021-06-19 NOTE — ED TRIAGE NOTES
Pt ambulatory to triage. Pt states she has new pt that is heavy and does not assist and she is having to lift her and now has pain in the right side and lower back. Pt states took tylenol and has helped.

## 2021-06-20 ENCOUNTER — HOSPITAL ENCOUNTER (EMERGENCY)
Age: 61
Discharge: HOME | End: 2021-06-20
Attending: EMERGENCY MEDICINE
Payer: COMMERCIAL

## 2021-06-20 VITALS
TEMPERATURE: 97.5 F | HEART RATE: 56 BPM | SYSTOLIC BLOOD PRESSURE: 165 MMHG | DIASTOLIC BLOOD PRESSURE: 90 MMHG | OXYGEN SATURATION: 99 % | RESPIRATION RATE: 16 BRPM

## 2021-06-20 DIAGNOSIS — M62.830 MUSCLE SPASM OF BACK: ICD-10-CM

## 2021-06-20 DIAGNOSIS — S39.012A BACK STRAIN, INITIAL ENCOUNTER: Primary | ICD-10-CM

## 2021-06-20 PROCEDURE — 99283 EMERGENCY DEPT VISIT LOW MDM: CPT

## 2021-06-20 PROCEDURE — 74011000250 HC RX REV CODE- 250: Performed by: EMERGENCY MEDICINE

## 2021-06-20 PROCEDURE — 74011250637 HC RX REV CODE- 250/637: Performed by: EMERGENCY MEDICINE

## 2021-06-20 RX ORDER — NAPROXEN 500 MG/1
500 TABLET ORAL 2 TIMES DAILY WITH MEALS
Qty: 20 TABLET | Refills: 0 | Status: SHIPPED | OUTPATIENT
Start: 2021-06-20 | End: 2021-06-30

## 2021-06-20 RX ORDER — LIDOCAINE 50 MG/G
1 PATCH TOPICAL
Status: DISCONTINUED | OUTPATIENT
Start: 2021-06-20 | End: 2021-06-20

## 2021-06-20 RX ORDER — LIDOCAINE 4 G/100G
1 PATCH TOPICAL EVERY 24 HOURS
Status: DISCONTINUED | OUTPATIENT
Start: 2021-06-20 | End: 2021-06-20 | Stop reason: HOSPADM

## 2021-06-20 RX ORDER — METHOCARBAMOL 500 MG/1
750 TABLET, FILM COATED ORAL
Status: COMPLETED | OUTPATIENT
Start: 2021-06-20 | End: 2021-06-20

## 2021-06-20 RX ORDER — KETOROLAC TROMETHAMINE 10 MG/1
10 TABLET, FILM COATED ORAL
Status: COMPLETED | OUTPATIENT
Start: 2021-06-20 | End: 2021-06-20

## 2021-06-20 RX ORDER — METHOCARBAMOL 750 MG/1
750 TABLET, FILM COATED ORAL 4 TIMES DAILY
Qty: 30 TABLET | Refills: 0 | Status: SHIPPED | OUTPATIENT
Start: 2021-06-20

## 2021-06-20 RX ADMIN — KETOROLAC TROMETHAMINE 10 MG: 10 TABLET, FILM COATED ORAL at 08:21

## 2021-06-20 RX ADMIN — METHOCARBAMOL TABLETS 750 MG: 500 TABLET, COATED ORAL at 08:21

## 2021-06-20 NOTE — ED TRIAGE NOTES
Pt ambulatory to amilcar. Pt states she was seen yesterday for back pain after lifting heavy pt at work and was told to alt tylenol and ibuprofen and heat and states she feels worse today.

## 2021-06-20 NOTE — Clinical Note
[address] [facility] EMERGENCY DEPT 
[address] [facility] [address] 
[phone] Work/School Note Date: 6/20/2021 To Whom It May concern: 
 
Hawk Perez was seen and treated today in the emergency room by the following provider(s): 
Attending Provider: Renetta Smallwood DO. Hawk Perez is excused from work/school on 06/20/21 and 06/21/21. She is medically clear to return to work/school on 6/22/2021. Sincerely, Quita Richardson DO

## 2021-06-20 NOTE — ED PROVIDER NOTES
Mikki Copeland is a 64 y.o. female seen on 6/20/2021 in the [facility] EMERGENCY DEPT in room ER17/17. Chief Complaint   Patient presents with    Back Pain     HPI: 65-year-old -American female with presented to the emergency department for reevaluation for continued back pain. Patient was seen in the emergency department yesterday after she hurt her back while at work. 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 She has been taking Motrin and Tylenol and using heat for her pain but it hurts worse today so she returned to the emergency department. Patient complains of left sided back pain. The pain does not radiate anywhere. It is worse with movement and palpation. She denies any changes in bowel or bladder habits. She denies any weakness or numbness. She has no other complaints at this time. Historian: Patient    REVIEW OF SYSTEMS     Review of Systems   Constitutional: Negative. HENT: Negative. Respiratory: Negative. Cardiovascular: Negative. Gastrointestinal: Negative. Genitourinary: Negative. Musculoskeletal: Positive for back pain. Skin: Negative. Neurological: Negative. Psychiatric/Behavioral: Negative. All other systems reviewed and are negative.       PAST MEDICAL HISTORY     Past Medical History:   Diagnosis Date    Arthritis     Autoimmune disease ([address]     lupus    DVT (deep venous thrombosis) (HCC)     to R LE    Esophageal stricture     Gastroesophageal reflux disease     Hypertension     Other ill-defined conditions(799.89)     lupus     Past Surgical History:   Procedure Laterality Date    HX GI      esopheagus stretched x2     Social History     Socioeconomic History    Marital status:      Spouse name: Not on file    Number of children: Not on file    Years of education: Not on file    Highest education level: Not on file   Tobacco Use    Smoking status: Never Smoker    Smokeless tobacco: Never Used   Substance and Sexual Activity    Alcohol use: No    Drug use: No    Sexual activity: Yes     Partners: Male     Birth control/protection: None     Social Determinants of Health     Financial Resource Strain:     Difficulty of Paying Living Expenses:    Food Insecurity:     Worried About Running Out of Food in the Last Year:     [address] in the Last Year:    Transportation Needs:     Lack of Transportation (Medical):  Lack of Transportation (Non-Medical):    Physical Activity:     Days of Exercise per Week:     Minutes of Exercise per Session:    Stress:     Feeling of Stress :    Social Connections:     Frequency of Communication with Friends and Family:     Frequency of Social Gatherings with Friends and Family:     Attends Rastafari Services:     Active Member of Clubs or Organizations:     Attends Club or Organization Meetings:     Marital Status:      Prior to Admission Medications   Prescriptions Last Dose Informant Patient Reported? Taking? Comp Stocking,Knee,Regular,Med misc   No No   Si Units by Does Not Apply route daily. KLOR-CON 10 10 mEq tablet  Self Yes No   Sig: Take 20 mEq by mouth daily. Pt takes one tab daily except Wed and . Pt takes 0.5 tab on Sun     albuterol (PROVENTIL HFA, VENTOLIN HFA, PROAIR HFA) 90 mcg/actuation inhaler   No No   Sig: Take 2 Puffs by inhalation every six (6) hours as needed for Wheezing. amLODIPine (NORVASC) 5 mg tablet   Yes No   Sig: Take 5 mg by mouth daily. budesonide (RHINOCORT AQUA) 32 mcg/actuation nasal spray   Yes No   Si Sprays by Both Nostrils route. cyclobenzaprine (FLEXERIL) 5 mg tablet   No No   Sig: Take 2 Tabs by mouth three (3) times daily as needed (pain). dexamethasone (DECADRON) 4 mg tablet   No No   Sig: Take 1 tab every 8 hours for the next 3 days.    hyoscyamine SL (LEVSIN/SL) 0.125 mg SL tablet   No No   Si Tab by SubLINGual route every four (4) hours as needed for Cramping. lisinopril (PRINIVIL, ZESTRIL) 5 mg tablet   Yes No   Sig: Take  by mouth daily. loratadine (CLARITIN) 10 mg tablet   Yes No   Sig: Take 10 mg by mouth daily. omeprazole (PRILOSEC) 40 mg capsule   No No   Sig: Take 1 Cap by mouth daily. ondansetron (Zofran ODT) 4 mg disintegrating tablet   No No   Sig: Take 1 Tab by mouth every eight (8) hours as needed for Nausea. promethazine (PHENERGAN) 25 mg tablet   No No   Sig: Take 1 Tab by mouth every six (6) hours as needed for Nausea. warfarin (COUMADIN) 5 mg tablet  Self Yes No   Sig: Take 5 mg by mouth daily. 7.5 on Wed, Fri      Facility-Administered Medications: None     Allergies   Allergen Reactions    Sulfa (Sulfonamide Antibiotics) Nausea and Vomiting        PHYSICAL EXAM       Vitals:    06/20/21 0726 06/20/21 0827   BP: (!) 144/86 (!) 165/90   Pulse: (!) 57 (!) 56   Resp: 16    Temp: 97.9 °F (36.6 °C) 97.5 °F (36.4 °C)   SpO2: 99% 99%    Vital signs were reviewed. Physical Exam  Vitals and nursing note reviewed. Constitutional:       General: She is not in acute distress. Appearance: Normal appearance. She is not ill-appearing or toxic-appearing. HENT:      Head: Normocephalic and atraumatic. Mouth/Throat:      Mouth: Mucous membranes are moist.   Eyes:      Extraocular Movements: Extraocular movements intact. Cardiovascular:      Pulses: Normal pulses. Pulmonary:      Effort: Pulmonary effort is normal.   Abdominal:      Palpations: Abdomen is soft. Tenderness: There is no abdominal tenderness. Musculoskeletal:         General: Tenderness present. Cervical back: Normal range of motion. No tenderness. Comments: Tenderness and spasm of left-sided rhomboid muscles and  paraspinal lumbar muscles. No midline tenderness. Skin:     General: Skin is warm and dry. Neurological:      General: No focal deficit present.       Mental Status: She is alert and oriented to person, place, and time.   Psychiatric:         Mood and Affect: Mood normal.         Behavior: Behavior normal.         Thought Content: Thought content normal.         Judgment: Judgment normal.          MEDICAL DECISION MAKING     ED Course:    No results found for this or any previous visit (from the past 8 hour(s)). No results found. MDM  Number of Diagnoses or Management Options  Back strain, initial encounter  Muscle spasm of back  Diagnosis management comments: 72-year-old female presents emergency department for reevaluation with back spasms after injuring her back while lifting a patient at work. Patient states that she cannot go back to work today because she is hurting worse. She denies any new symptoms. Patient given muscle relaxers and anti-inflammatories. She was discharged home she will follow-up with family doctor. Patient Progress  Patient progress: stable        Disposition: Discharged  Diagnosis:     ICD-10-CM ICD-9-CM   1. Back strain, initial encounter  S39.012A 847.9   2. Muscle spasm of back  M62.830 724.8     ____________________________________________________________________  A portion of this note was generated using voice recognition dictation software. While the note has been reviewed for accuracy, please note certain words and phrases may not be transcribed as intended and some grammatical and/or typographical errors may be present.

## 2021-06-20 NOTE — ED NOTES
I have reviewed discharge instructions with the patient. The patient verbalized understanding. Patient left ED via Discharge Method: ambulatory to Home with self. Opportunity for questions and clarification provided. Patient given 2 scripts. To continue your aftercare when you leave the hospital, you may receive an automated call from our care team to check in on how you are doing. This is a free service and part of our promise to provide the best care and service to meet your aftercare needs.  If you have questions, or wish to unsubscribe from this service please call [phone]. Thank you for Choosing our [facility] Emergency Department.

## 2021-06-24 ENCOUNTER — HOSPITAL ENCOUNTER (EMERGENCY)
Age: 61
Discharge: HOME | End: 2021-06-25
Attending: EMERGENCY MEDICINE
Payer: COMMERCIAL

## 2021-06-24 VITALS
DIASTOLIC BLOOD PRESSURE: 85 MMHG | WEIGHT: 149 LBS | HEIGHT: 62 IN | HEART RATE: 58 BPM | TEMPERATURE: 98.4 F | SYSTOLIC BLOOD PRESSURE: 149 MMHG | BODY MASS INDEX: 27.42 KG/M2 | RESPIRATION RATE: 16 BRPM | OXYGEN SATURATION: 100 %

## 2021-06-24 DIAGNOSIS — S39.011A STRAIN OF ABDOMINAL MUSCLE, INITIAL ENCOUNTER: ICD-10-CM

## 2021-06-24 DIAGNOSIS — S39.012A BACK STRAIN, INITIAL ENCOUNTER: Primary | ICD-10-CM

## 2021-06-24 PROCEDURE — 99284 EMERGENCY DEPT VISIT MOD MDM: CPT

## 2021-06-24 PROCEDURE — 81003 URINALYSIS AUTO W/O SCOPE: CPT

## 2021-06-24 PROCEDURE — 96372 THER/PROPH/DIAG INJ SC/IM: CPT

## 2021-06-24 NOTE — Clinical Note
[address] [facility] EMERGENCY DEPT   [facility] [address] [phone]    Work/School Note    Date: 6/24/2021    To Whom It May concern:      Alberto Griffith was seen and treated today in the emergency room by the following provider(s):  Attending Provider: Yulisa Puga MD.      Alberto Griffith is excused from work/school on 06/25/21. She is clear to return to work/school on 06/26/21.         Sincerely,          Silviano Shore MD

## 2021-06-24 NOTE — Clinical Note
[address] [facility] EMERGENCY DEPT   [facility] [id number] [phone]    Work/School Note    Date: 6/24/2021    To Whom It May concern:      Chivo Silva was seen and treated today in the emergency room by the following provider(s):  Attending Provider: William Trujillo MD.      Chivo Silva is excused from work/school on 06/25/21. She is clear to return to work/school on 06/26/21.         Sincerely,          Colt Eaton MD

## 2021-06-25 LAB
ALBUMIN SERPL-MCNC: 3.6 G/DL (ref 3.5–5)
ALBUMIN/GLOB SERPL: 0.8 {RATIO} (ref 1.2–3.5)
ALP SERPL-CCNC: 61 U/L (ref 50–136)
ALT SERPL-CCNC: 60 U/L (ref 12–65)
ANION GAP SERPL CALC-SCNC: 6 MMOL/L (ref 7–16)
AST SERPL-CCNC: 55 U/L (ref 15–37)
ATRIAL RATE: 68 BPM
BILIRUB SERPL-MCNC: 0.4 MG/DL (ref 0.2–1.1)
BUN SERPL-MCNC: 18 MG/DL (ref 6–23)
CALCIUM SERPL-MCNC: 9 MG/DL (ref 8.3–10.4)
CALCULATED P AXIS, ECG09: 62 DEGREES
CALCULATED R AXIS, ECG10: 77 DEGREES
CALCULATED T AXIS, ECG11: 43 DEGREES
CHLORIDE SERPL-SCNC: 111 MMOL/L (ref 98–107)
CO2 SERPL-SCNC: 25 MMOL/L (ref 21–32)
CREAT SERPL-MCNC: 0.7 MG/DL (ref 0.6–1)
DIAGNOSIS, 93000: NORMAL
ERYTHROCYTE [DISTWIDTH] IN BLOOD BY AUTOMATED COUNT: 13 % (ref 11.9–14.6)
GLOBULIN SER CALC-MCNC: 4.4 G/DL (ref 2.3–3.5)
GLUCOSE SERPL-MCNC: 85 MG/DL (ref 65–100)
HCT VFR BLD AUTO: 36.4 % (ref 35.8–46.3)
HGB BLD-MCNC: 11.7 G/DL (ref 11.7–15.4)
LIPASE SERPL-CCNC: 82 U/L (ref 73–393)
MCH RBC QN AUTO: 30.3 PG (ref 26.1–32.9)
MCHC RBC AUTO-ENTMCNC: 32.1 G/DL (ref 31.4–35)
MCV RBC AUTO: 94.3 FL (ref 79.6–97.8)
NRBC # BLD: 0 K/UL (ref 0–0.2)
P-R INTERVAL, ECG05: 126 MS
PLATELET # BLD AUTO: 189 K/UL (ref 150–450)
PMV BLD AUTO: 10 FL (ref 9.4–12.3)
POTASSIUM SERPL-SCNC: 4.5 MMOL/L (ref 3.5–5.1)
PROT SERPL-MCNC: 8 G/DL (ref 6.3–8.2)
Q-T INTERVAL, ECG07: 398 MS
QRS DURATION, ECG06: 80 MS
QTC CALCULATION (BEZET), ECG08: 423 MS
RBC # BLD AUTO: 3.86 M/UL (ref 4.05–5.2)
SODIUM SERPL-SCNC: 142 MMOL/L (ref 136–145)
TROPONIN-HIGH SENSITIVITY: 21.1 PG/ML (ref 0–14)
TROPONIN-HIGH SENSITIVITY: 24 PG/ML (ref 0–14)
VENTRICULAR RATE, ECG03: 68 BPM
WBC # BLD AUTO: 6.6 K/UL (ref 4.3–11.1)

## 2021-06-25 PROCEDURE — 83690 ASSAY OF LIPASE: CPT

## 2021-06-25 PROCEDURE — 74011250636 HC RX REV CODE- 250/636: Performed by: EMERGENCY MEDICINE

## 2021-06-25 PROCEDURE — 85027 COMPLETE CBC AUTOMATED: CPT

## 2021-06-25 PROCEDURE — 84484 ASSAY OF TROPONIN QUANT: CPT

## 2021-06-25 PROCEDURE — 80053 COMPREHEN METABOLIC PANEL: CPT

## 2021-06-25 PROCEDURE — 93005 ELECTROCARDIOGRAM TRACING: CPT | Performed by: EMERGENCY MEDICINE

## 2021-06-25 RX ORDER — KETOROLAC TROMETHAMINE 15 MG/ML
15 INJECTION, SOLUTION INTRAMUSCULAR; INTRAVENOUS
Status: COMPLETED | OUTPATIENT
Start: 2021-06-25 | End: 2021-06-25

## 2021-06-25 RX ADMIN — KETOROLAC TROMETHAMINE 15 MG: 15 INJECTION, SOLUTION INTRAMUSCULAR; INTRAVENOUS at 00:44

## 2021-06-25 NOTE — ED TRIAGE NOTES
Pt presents ambulatory to triage in no apparent distress with mask in place. Pt sts she was trying to move a pt tonight and sts \"I hurt from here [gestures to abdomen] all the way around [facility] encircling motion around to back]. I just can't move her no more. This is the third time I've been over here because of this. PT complains of back spasms tonight with the garima.

## 2021-06-25 NOTE — ED PROVIDER NOTES
The history is provided by the patient. Back Pain   This is a recurrent problem. The current episode started 3 to 5 hours ago. The problem has not changed since onset. The problem occurs constantly. The pain is associated with lifting. The pain is present in the thoracic spine and lumbar spine. The quality of the pain is described as sharp. Radiates to: around to abd. The pain is moderate. Associated symptoms include abdominal pain. Pertinent negatives include no chest pain, no fever, no numbness, no weight loss, no bowel incontinence, no perianal numbness, no bladder incontinence, no dysuria, no leg pain, no paresthesias, no paresis, no tingling and no weakness. She has tried NSAIDs and muscle relaxants for the symptoms. The treatment provided no relief. Past Medical History:   Diagnosis Date    Arthritis     Autoimmune disease ([address]     lupus    DVT (deep venous thrombosis) (HCC)     to R LE    Esophageal stricture     Gastroesophageal reflux disease     Hypertension     Other ill-defined conditions(799.89)     lupus       Past Surgical History:   Procedure Laterality Date    HX GI      esopheagus stretched x2         No family history on file.     Social History     Socioeconomic History    Marital status:      Spouse name: Not on file    Number of children: Not on file    Years of education: Not on file    Highest education level: Not on file   Occupational History    Not on file   Tobacco Use    Smoking status: Never Smoker    Smokeless tobacco: Never Used   Substance and Sexual Activity    Alcohol use: No    Drug use: No    Sexual activity: Yes     Partners: Male     Birth control/protection: None   Other Topics Concern    Not on file   Social History Narrative    Not on file     Social Determinants of Health     Financial Resource Strain:     Difficulty of Paying Living Expenses:    Food Insecurity:     Worried About Running Out of Food in the Last Year:     Mayda mandujano Food in the Last Year:    Transportation Needs:     Lack of Transportation (Medical):  Lack of Transportation (Non-Medical):    Physical Activity:     Days of Exercise per Week:     Minutes of Exercise per Session:    Stress:     Feeling of Stress :    Social Connections:     Frequency of Communication with Friends and Family:     Frequency of Social Gatherings with Friends and Family:     Attends Scientologist Services:     Active Member of Clubs or Organizations:     Attends Club or Organization Meetings:     Marital Status:    Intimate Partner Violence:     Fear of Current or Ex-Partner:     Emotionally Abused:     Physically Abused:     Sexually Abused: ALLERGIES: Sulfa (sulfonamide antibiotics)    Review of Systems   Constitutional: Negative for fever and weight loss. Respiratory: Negative for cough and shortness of breath. Cardiovascular: Negative for chest pain. Gastrointestinal: Positive for abdominal pain. Negative for blood in stool, bowel incontinence, constipation, diarrhea, nausea and vomiting. Genitourinary: Negative for bladder incontinence, dysuria, frequency, hematuria and urgency. Musculoskeletal: Positive for back pain. Neurological: Negative for tingling, weakness, numbness and paresthesias. Vitals:    06/24/21 2357   BP: (!) 149/85   Pulse: (!) 58   Resp: 16   Temp: 98.4 °F (36.9 °C)   SpO2: 100%   Weight: 67.6 kg (149 lb)   Height: 5' 2\" (1.575 m)            Physical Exam  Vitals and nursing note reviewed. Constitutional:       General: She is not in acute distress. Appearance: She is not ill-appearing. Pulmonary:      Effort: Pulmonary effort is normal.      Breath sounds: Normal breath sounds. Abdominal:      General: There is no distension. Palpations: Abdomen is soft. There is no mass. Tenderness: There is abdominal tenderness ( Mild bilateral upper abdominal tenderness, benign exam, no pulsatile abdominal mass).  There is no guarding or rebound. Hernia: No hernia is present. Musculoskeletal:         General: Normal range of motion. Skin:     General: Skin is warm and dry. Neurological:      Mental Status: She is alert. Sensory: No sensory deficit. Motor: No weakness. Deep Tendon Reflexes:      Reflex Scores:       Patellar reflexes are 3+ on the right side and 3+ on the left side. Achilles reflexes are 2+ on the right side and 2+ on the left side. MDM  Number of Diagnoses or Management Options  Diagnosis management comments: We will check abdominal labs given abdominal pain based on exam and CT scan imaging not indicated. Treat pain. 2:56 AM  Labs rather unremarkable. EKG normal and troponin high-sensitivity x2 not consistent with MI. Discharge home with PCP follow-up musculoskeletal back pain treatment. Amount and/or Complexity of Data Reviewed  Clinical lab tests: ordered and reviewed  Tests in the medicine section of CPT®: ordered and reviewed  Independent visualization of images, tracings, or specimens: yes (EKG interpretation in absence of cardiology  EKG shows a normal sinus rhythm, normal axis, no hypertrophy and no acute ST-T changes.   Normal EKG  Read by ED physician Dr. Armando Dunlap  )    Risk of Complications, Morbidity, and/or Mortality  Presenting problems: low  Diagnostic procedures: low  Management options: low           Procedures

## 2021-06-25 NOTE — DISCHARGE INSTRUCTIONS
Tylenol and Motrin for pain. Ice to the sore areas for 15 minutes every 4 hours while awake for 2 to 3 days then change to heat for a few days. Discussed the lifting situations you have put in at work with your employer.

## 2021-07-25 NOTE — ED TRIAGE NOTES
Puncture wound to right foot. States she stepped on a nail. Attending Attestation (For Attendings USE Only)...

## 2022-05-20 ENCOUNTER — HOSPITAL ENCOUNTER (EMERGENCY)
Age: 62
Discharge: HOME | End: 2022-05-20
Attending: EMERGENCY MEDICINE

## 2022-05-20 VITALS
DIASTOLIC BLOOD PRESSURE: 98 MMHG | TEMPERATURE: 99 F | HEIGHT: 62 IN | RESPIRATION RATE: 18 BRPM | WEIGHT: 149 LBS | OXYGEN SATURATION: 100 % | BODY MASS INDEX: 27.42 KG/M2 | HEART RATE: 78 BPM | SYSTOLIC BLOOD PRESSURE: 143 MMHG

## 2022-05-20 DIAGNOSIS — Z91.09 ENVIRONMENTAL ALLERGIES: Primary | ICD-10-CM

## 2022-05-20 PROCEDURE — 99282 EMERGENCY DEPT VISIT SF MDM: CPT

## 2022-05-20 NOTE — DISCHARGE INSTRUCTIONS
Take your zyrtec and use your Rhinocort as prescribed by your regular doctor. Follow up with your PCP in the next 1-2 days if no improvement. Return to the ER for any new or worsening symptoms.

## 2022-05-20 NOTE — ED PROVIDER NOTES
42-year-old well-appearing female presents today for evaluation of allergies that were flared up when she had to work at the patient's home that smoked. 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 She states that after being in the house for an extended period of time she felt her allergies flaring up. She does have a known sensitivity to tobacco smoke in the past and states her symptoms today are similar. She reports itchy watery eyes, runny nose, sinus pain and pressure, and mild headache. She also reports postnasal drip and cough. No shortness of breath or chest pain. She denies recent illness, no fever, chills or body aches. She states that she does use Zyrtec and Rhinocort nasal spray daily for her environmental allergies which seem to be helping. She also states that her symptoms are slowly improving now that she has removed herself from that environment. Past Medical History:   Diagnosis Date    Arthritis     Auto[address] disease [address] [address]     lupus    DVT (deep venous thrombosis) ([facility])     to R LE    Esophageal stricture     Gastroesophageal reflux disease     Hypertension     Other ill-defined conditions(799.89)     lupus       Past Surgical History:   Procedure Laterality Date    HX GI      esopheagus stretched x2         No family history on file.     Social History     Socioeconomic History    Marital status:      Spouse name: Not on file    Number of children: Not on file    Years of education: Not on file    Highest education level: Not on file   Occupational History    Not on file   Tobacco Use    Smoking status: Never Smoker    Smokeless tobacco: Never Used   Substance and Sexual Activity    Alcohol use: No    Drug use: No    Sexual activity: Yes     Partners: Male     Birth control/protection: None   Other Topics Concern    Not on file   Social History Narrative  Not on file     Social Determinants of Health     Financial Resource Strain:     Difficulty of Paying Living Expenses: Not on file   Food Insecurity:     Worried About Running Out of Food in the Last Year: Not on file    Mayda of Food in the Last Year: Not on file   Transportation Needs:     Lack of Transportation (Medical): Not on file    Lack of Transportation (Non-Medical): Not on file   Physical Activity:     Days of Exercise per Week: Not on file    Minutes of Exercise per Session: Not on file   Stress:     Feeling of Stress : Not on file   Social Connections:     Frequency of Communication with Friends and Family: Not on file    Frequency of Social Gatherings with Friends and Family: Not on file    Attends Cheondoism Services: Not on file    Active Member of [address] [place] [address] or Organizations: Not on file    Attends Club or Organization Meetings: Not on file    Marital Status: Not on file   Intimate Partner Violence:     Fear of Current or Ex-Partner: Not on file    Emotionally Abused: Not on file    Physically Abused: Not on file    Sexually Abused: Not on file   Housing Stability:     Unable to Pay for Housing in the Last Year: Not on file    Number of [place] in the Last Year: Not on file    Unstable Housing in the Last Year: Not on file         ALLERGIES: Sulfa (sulfonamide antibiotics)    Review of Systems   Constitutional: Negative for activity change, chills, fatigue and fever. HENT: Positive for congestion, postnasal drip, rhinorrhea and sinus pressure. Negative for sore throat. Respiratory: Positive for cough. Negative for shortness of breath. Cardiovascular: Negative for chest pain. Gastrointestinal: Negative for abdominal pain, nausea and vomiting. Musculoskeletal: Negative for back pain and neck pain. Skin: Negative for rash. Allergic/Immunologic: Positive for environmental allergies. Neurological: Negative for dizziness, numbness and headaches.        Vitals: 05/20/22 1708   BP: (!) 143/98   Pulse: 78   Resp: 18   Temp: 99 °F (37.2 °C)   SpO2: 100%   Weight: 67.6 kg (149 lb)   Height: 5' 2\" (1.575 m)            Physical Exam  Vitals and nursing note reviewed. Constitutional:       General: She is awake. She is not in acute distress. Appearance: She is well-developed and well-groomed. She is not toxic-appearing. HENT:      Head: Normocephalic and atraumatic. Right Ear: Tympanic membrane and ear canal normal. No hemotympanum. Tympanic membrane is not erythematous or bulging. Left Ear: Tympanic membrane and ear canal normal. No hemotympanum. Tympanic membrane is not erythematous or bulging. Mouth/Throat:      Mouth: Mucous membranes are moist.      Pharynx: Oropharynx is clear. Uvula midline. No pharyngeal swelling, oropharyngeal exudate or posterior oropharyngeal erythema. Tonsils: No tonsillar abscesses. Cardiovascular:      Rate and Rhythm: Normal rate and regular rhythm. Heart sounds: S1 normal and S2 normal. No murmur heard. No friction rub. No gallop. Pulmonary:      Effort: Pulmonary effort is normal.      Breath sounds: No wheezing, rhonchi or rales. Abdominal:      General: Bowel sounds are normal.      Palpations: Abdomen is soft. Tenderness: There is no abdominal tenderness. There is no guarding or rebound. Musculoskeletal:      Cervical back: Normal range of motion. No rigidity. Lymphadenopathy:      Cervical: No cervical adenopathy. Skin:     General: Skin is warm and dry. Capillary Refill: Capillary refill takes less than 2 seconds. Neurological:      General: No focal deficit present. Mental Status: She is alert and oriented to person, place, and time. Sensory: Sensation is intact. Motor: Motor function is intact. Coordination: Coordination is intact.    Psychiatric:         Mood and Affect: Mood normal.         Speech: Speech normal.         Behavior: Behavior normal. MDM  Number of Diagnoses or Management Options  Environmental allergies  Diagnosis management comments: In summary this a well-appearing 68-year-old female presenting today after her allergies were flared up after being exposed to tobacco smoke at a client's house. She states that her symptoms are slowly improving since she has removed herself from that situation. Overall vitals are stable and physical exam is reassuring. Patient takes daily Zyrtec and uses Rhinocort nasal spray. Discussed with patient importance of continuing the use of these medications and likely her symptoms are self-limiting. She was provided with a work note for today. Patient discharged home in stable condition. Sheela Watson; 5/20/2022 @7:12 PM Voice dictation software was used during the making of this note. This software is not perfect and grammatical and other typographical errors may be present.   This note has not been proofread for errors.  ====================================      Patient Progress  Patient progress: stable         Procedures

## 2022-05-20 NOTE — ED TRIAGE NOTES
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 Patient reports pressure in her sinuses, nasal congestion, and headache. States she took multiple allergies medications prior to arrival. No fever noted.

## 2022-05-20 NOTE — Clinical Note
[facility] EMERGENCY DEPT   [facility] [id number] [phone]    Work/School Note    Date: 5/20/2022    To Whom It May concern:      Zoltan Garay was seen and treated today in the emergency room by the following provider(s):  Attending Provider: William Murrieta MD  Physician Assistant: Sheela Chaudhari. Zoltan Garay is excused from work/school on 05/20/22. She is clear to return to work/school on 05/21/22.         Sincerely,          Magdalene Opitz, [place]

## 2022-12-04 ENCOUNTER — APPOINTMENT (OUTPATIENT)
Dept: ULTRASOUND IMAGING | Age: 62
End: 2022-12-04

## 2022-12-04 ENCOUNTER — HOSPITAL ENCOUNTER (EMERGENCY)
Age: 62
Discharge: HOME | End: 2022-12-04
Attending: EMERGENCY MEDICINE

## 2022-12-04 VITALS
BODY MASS INDEX: 29.08 KG/M2 | RESPIRATION RATE: 16 BRPM | HEART RATE: 59 BPM | TEMPERATURE: 98 F | WEIGHT: 158 LBS | SYSTOLIC BLOOD PRESSURE: 160 MMHG | HEIGHT: 62 IN | OXYGEN SATURATION: 98 % | DIASTOLIC BLOOD PRESSURE: 81 MMHG

## 2022-12-04 DIAGNOSIS — I82.90 NON-OCCLUSIVE THROMBUS: ICD-10-CM

## 2022-12-04 DIAGNOSIS — M25.551 PAIN IN JOINT INVOLVING RIGHT PELVIC REGION AND THIGH: Primary | ICD-10-CM

## 2022-12-04 LAB
ALBUMIN SERPL-MCNC: 3.7 G/DL (ref 3.5–5)
ALBUMIN/GLOB SERPL: 0.7 {RATIO} (ref 0.4–1.6)
ALP SERPL-CCNC: 70 U/L (ref 50–136)
ALT SERPL-CCNC: 35 U/L (ref 12–65)
ANION GAP SERPL CALC-SCNC: 5 MMOL/L (ref 2–11)
AST SERPL-CCNC: 32 U/L (ref 15–37)
BASOPHILS # BLD: 0 K/UL (ref 0–0.2)
BASOPHILS NFR BLD: 0 % (ref 0–2)
BILIRUB SERPL-MCNC: 0.3 MG/DL (ref 0.2–1.1)
BUN SERPL-MCNC: 15 MG/DL (ref 6–23)
CALCIUM SERPL-MCNC: 9.2 MG/DL (ref 8.3–10.4)
CHLORIDE SERPL-SCNC: 113 MMOL/L (ref 101–110)
CO2 SERPL-SCNC: 25 MMOL/L (ref 21–32)
CREAT SERPL-MCNC: 0.9 MG/DL (ref 0.6–1)
DIFFERENTIAL METHOD BLD: ABNORMAL
EOSINOPHIL # BLD: 0 K/UL (ref 0–0.8)
EOSINOPHIL NFR BLD: 0 % (ref 0.5–7.8)
ERYTHROCYTE [DISTWIDTH] IN BLOOD BY AUTOMATED COUNT: 13.1 % (ref 11.9–14.6)
GLOBULIN SER CALC-MCNC: 5.2 G/DL (ref 2.8–4.5)
GLUCOSE SERPL-MCNC: 99 MG/DL (ref 65–100)
HCT VFR BLD AUTO: 40.3 % (ref 35.8–46.3)
HGB BLD-MCNC: 12.9 G/DL (ref 11.7–15.4)
IMM GRANULOCYTES # BLD AUTO: 0.1 K/UL (ref 0–0.5)
IMM GRANULOCYTES NFR BLD AUTO: 1 % (ref 0–5)
INR PPP: 2.1
LYMPHOCYTES # BLD: 1.5 K/UL (ref 0.5–4.6)
LYMPHOCYTES NFR BLD: 30 % (ref 13–44)
MCH RBC QN AUTO: 30.1 PG (ref 26.1–32.9)
MCHC RBC AUTO-ENTMCNC: 32 G/DL (ref 31.4–35)
MCV RBC AUTO: 94.2 FL (ref 82–102)
MONOCYTES # BLD: 0.4 K/UL (ref 0.1–1.3)
MONOCYTES NFR BLD: 9 % (ref 4–12)
NEUTS SEG # BLD: 2.9 K/UL (ref 1.7–8.2)
NEUTS SEG NFR BLD: 60 % (ref 43–78)
NRBC # BLD: 0 K/UL (ref 0–0.2)
PLATELET # BLD AUTO: 246 K/UL (ref 150–450)
PMV BLD AUTO: 10.3 FL (ref 9.4–12.3)
POTASSIUM SERPL-SCNC: 3.5 MMOL/L (ref 3.5–5.1)
PROT SERPL-MCNC: 8.9 G/DL (ref 6.3–8.2)
PROTHROMBIN TIME: 23.9 SEC (ref 12.6–14.3)
RBC # BLD AUTO: 4.28 M/UL (ref 4.05–5.2)
SODIUM SERPL-SCNC: 143 MMOL/L (ref 133–143)
WBC # BLD AUTO: 4.9 K/UL (ref 4.3–11.1)

## 2022-12-04 PROCEDURE — 85610 PROTHROMBIN TIME: CPT

## 2022-12-04 PROCEDURE — 80053 COMPREHEN METABOLIC PANEL: CPT

## 2022-12-04 PROCEDURE — 85025 COMPLETE CBC W/AUTO DIFF WBC: CPT

## 2022-12-04 PROCEDURE — 93971 EXTREMITY STUDY: CPT

## 2022-12-04 PROCEDURE — 99284 EMERGENCY DEPT VISIT MOD MDM: CPT

## 2022-12-04 ASSESSMENT — PAIN DESCRIPTION - PAIN TYPE: TYPE: ACUTE PAIN

## 2022-12-04 ASSESSMENT — PAIN DESCRIPTION - LOCATION: LOCATION: LEG

## 2022-12-04 ASSESSMENT — PAIN - FUNCTIONAL ASSESSMENT: PAIN_FUNCTIONAL_ASSESSMENT: 0-10

## 2022-12-04 ASSESSMENT — PAIN SCALES - GENERAL: PAINLEVEL_OUTOF10: 5

## 2022-12-04 ASSESSMENT — PAIN DESCRIPTION - ORIENTATION: ORIENTATION: RIGHT

## 2022-12-04 NOTE — ED PROVIDER NOTES
Emergency Department Provider Note                   PCP:                Zoila Fitzpatrick MD               Age: 63 y.o. Sex: female     No diagnosis found. DISPOSITION          MDM  Number of Diagnoses or Management Options  Non-occlusive thrombus  Pain in joint involving right pelvic region and thigh  Diagnosis management comments: Patient's pain is mainly to the anterior thigh there is no fever or redness or infectious changes. Venous region seem to be benign and uninvolved palpation no tenderness or unusual swelling to these areas. She is slightly subtherapeutic on her Coumadin. She will bump up her Sunday dose to full dose. Nonocclusive clot may be her baseline but have asked her to see her primary doctor and consideration of reimaging with ultrasound and 7 to 10 days. No evidence of PE       Amount and/or Complexity of Data Reviewed  Clinical lab tests: ordered and reviewed  Tests in the radiology section of CPT®: reviewed and ordered    Risk of Complications, Morbidity, and/or Mortality  Presenting problems: high  Diagnostic procedures: high  Management options: high    Patient Progress  Patient progress: stable             Orders Placed This Encounter   Procedures    Vascular duplex lower extremity venous right        Medications - No data to display    New Prescriptions    No medications on file        Josr Ray is a 63 y.o. female who presents to the Emergency Department with chief complaint of  No chief complaint on file. History of PE and is on Coumadin for this. She has been working more and has some pain now mainly to her right upper leg and thigh region more than anywhere else. Any fever. There is no redness or visible swelling to the area. Denies any chest pain or shortness of breath. No back pain. No coolness or other changes distal to the area of thigh pain. States she is compliant with her Coumadin.   Patient states she does not feel this is a blood clot but is willing for us to do further testing regarding this. No pleuritic pain. No shortness of breath. No other area of swelling. Pain is mainly to the anterior right thigh. She has no popliteal region pain. No distal lower extremity pain involving calf or foot. In the past she was on Coumadin with half doses on Wednesday Friday and Sunday. She can increase to full dosing on Wednesday and Friday with half dosing remaining on Sunday. The history is provided by the patient and the spouse. Review of Systems   All other systems reviewed and are negative. Past Medical History:   Diagnosis Date    Arthritis     Autoimmune disease ([address]     lupus    DVT (deep venous thrombosis) (HCC)     to R LE    Esophageal stricture     Gastroesophageal reflux disease     Hypertension     Other ill-defined conditions(799.89)     lupus        Past Surgical History:   Procedure Laterality Date    GI      esopheagus stretched x2        No family history on file. Social History     Socioeconomic History    Marital status: Single   Tobacco Use    Smoking status: Never    Smokeless tobacco: Never   Substance and Sexual Activity    Alcohol use: No    Drug use: No         Sulfa antibiotics     Previous Medications    ALBUTEROL SULFATE  (90 BASE) MCG/ACT INHALER    Inhale 2 puffs into the lungs every 6 hours as needed    AMLODIPINE (NORVASC) 5 MG TABLET    Take 5 mg by mouth daily    BUDESONIDE (RINOCORT AQUA) 32 MCG/ACT NASAL SPRAY    2 sprays by Nasal route    CYCLOBENZAPRINE (FLEXERIL) 5 MG TABLET    Take 10 mg by mouth 3 times daily as needed    DEXAMETHASONE (DECADRON) 4 MG TABLET    Take 1 tab every 8 hours for the next 3 days.     HYOSCYAMINE SULFATE SL 0.125 MG SUBL    Place 0.125 mg under the tongue every 4 hours as needed    LISINOPRIL (PRINIVIL;ZESTRIL) 5 MG TABLET    Take by mouth daily    LORATADINE (CLARITIN) 10 MG TABLET    Take 10 mg by mouth daily    METHOCARBAMOL (ROBAXIN) 750 MG TABLET    Take 750 mg by mouth 4 times daily    OMEPRAZOLE (PRILOSEC) 40 MG DELAYED RELEASE CAPSULE    Take 40 mg by mouth daily    ONDANSETRON (ZOFRAN-ODT) 4 MG DISINTEGRATING TABLET    Take 4 mg by mouth every 8 hours as needed    POTASSIUM CHLORIDE (KLOR-CON 10) 10 MEQ EXTENDED RELEASE TABLET    Take 20 mEq by mouth daily    PROMETHAZINE (PHENERGAN) 25 MG TABLET    Take 25 mg by mouth every 6 hours as needed    WARFARIN (COUMADIN) 5 MG TABLET    Take 5 mg by mouth daily        Vitals signs and nursing note reviewed. Patient Vitals for the past 4 hrs:   Temp Pulse Resp BP SpO2   12/04/22 1033 98 °F (36.7 °C) 69 18 (!) 151/85 100 %          Physical Exam  Vitals and nursing note reviewed. HENT:      Head: Atraumatic. Right Ear: External ear normal.      Left Ear: External ear normal.      Nose: Nose normal.   Eyes:      General: No scleral icterus. Cardiovascular:      Rate and Rhythm: Normal rate and regular rhythm. Pulses: Normal pulses. Comments: No tachycardia  Pulmonary:      Effort: Pulmonary effort is normal.      Breath sounds: Normal breath sounds. Abdominal:      Palpations: Abdomen is soft. Musculoskeletal:      Comments: Mainly sore to the anterior right thigh. No significant medial thigh pain. No popliteal or distal complaint. Exam is more consistent with an acute muscular complaint than anything else at present. Left lower extremity entirely normal.  Upper extremities are uninvolved with no complaint of acute nature   Skin:     Coloration: Skin is not pale. Findings: No bruising, erythema or rash. Neurological:      General: No focal deficit present. Mental Status: She is alert. Mental status is at baseline. Sensory: No sensory deficit. Motor: No weakness. Psychiatric:         Behavior: Behavior normal.        Procedures    No results found for any visits on 12/04/22.      Vascular duplex lower extremity venous right    (Results Pending)                       Voice dictation software was used during the making of this note. This software is not perfect and grammatical and other typographical errors may be present. This note has not been completely proofread for errors.      Antonio Grant MD  12/06/22 9582

## 2022-12-04 NOTE — ED TRIAGE NOTES
Pt ambulatory to triage for reports of R leg pain & swelling x 1-2 weeks. Pt wit hx DVT in 2007 & lupus. Pt states she is currently on warfarin, recent increase to 5mg a day. Pt reports 5/10 pain to R upper leg at this time. Pt a&ox4.

## 2022-12-04 NOTE — DISCHARGE INSTRUCTIONS
Coumadin doses to be 1 mg/day  Follow-up on PT/INR anterior regular lab test day on Friday  Recheck with a new or worsening problems

## 2022-12-04 NOTE — Clinical Note
Lou Downs was seen and treated in our emergency department on 12/4/2022. She may return to work on 12/06/2022. If you have any questions or concerns, please don't hesitate to call.       Av Richards RN

## 2022-12-04 NOTE — ED NOTES
I have reviewed discharge instructions with the patient. The patient verbalized understanding. Patient left ED via Discharge Method: ambulatory to Home with self. Opportunity for questions and clarification provided. Patient given 0 scripts. To continue your aftercare when you leave the hospital, you may receive an automated call from our care team to check in on how you are doing. This is a free service and part of our promise to provide the best care and service to meet your aftercare needs.  If you have questions, or wish to unsubscribe from this service please call [phone]. Thank you for Choosing our [facility] Emergency Department.         Kunal Johnson RN  12/04/22 7981

## 2023-05-14 ENCOUNTER — HOSPITAL ENCOUNTER (EMERGENCY)
Age: 63
Discharge: HOME | End: 2023-05-14
Attending: EMERGENCY MEDICINE

## 2023-05-14 VITALS
DIASTOLIC BLOOD PRESSURE: 80 MMHG | HEIGHT: 62 IN | HEART RATE: 70 BPM | OXYGEN SATURATION: 99 % | SYSTOLIC BLOOD PRESSURE: 141 MMHG | RESPIRATION RATE: 18 BRPM | WEIGHT: 156 LBS | TEMPERATURE: 98.5 F | BODY MASS INDEX: 28.71 KG/M2

## 2023-05-14 DIAGNOSIS — R21 RASH AND OTHER NONSPECIFIC SKIN ERUPTION: Primary | ICD-10-CM

## 2023-05-14 PROCEDURE — 99282 EMERGENCY DEPT VISIT SF MDM: CPT

## 2023-05-14 ASSESSMENT — LIFESTYLE VARIABLES
HOW OFTEN DO YOU HAVE A DRINK CONTAINING ALCOHOL: NEVER
HOW MANY STANDARD DRINKS CONTAINING ALCOHOL DO YOU HAVE ON A TYPICAL DAY: PATIENT DOES NOT DRINK

## 2023-05-14 ASSESSMENT — PAIN - FUNCTIONAL ASSESSMENT
PAIN_FUNCTIONAL_ASSESSMENT: NONE - DENIES PAIN
PAIN_FUNCTIONAL_ASSESSMENT: NONE - DENIES PAIN

## 2023-05-14 ASSESSMENT — ENCOUNTER SYMPTOMS
SHORTNESS OF BREATH: 0
ABDOMINAL PAIN: 0

## 2023-10-28 ENCOUNTER — HOSPITAL ENCOUNTER (EMERGENCY)
Age: 63
Discharge: HOME | End: 2023-10-28
Attending: GENERAL PRACTICE | Admitting: INTERNAL MEDICINE

## 2023-10-28 VITALS
HEART RATE: 97 BPM | SYSTOLIC BLOOD PRESSURE: 151 MMHG | DIASTOLIC BLOOD PRESSURE: 92 MMHG | TEMPERATURE: 97.9 F | OXYGEN SATURATION: 100 % | RESPIRATION RATE: 17 BRPM

## 2023-10-28 DIAGNOSIS — I10 HYPERTENSION, UNSPECIFIED TYPE: Primary | ICD-10-CM

## 2023-10-28 PROCEDURE — 99282 EMERGENCY DEPT VISIT SF MDM: CPT

## 2023-10-28 NOTE — ED PROVIDER NOTES
atraumatic. Nose: Nose normal.      Mouth/Throat:      Mouth: Mucous membranes are moist.   Eyes:      Extraocular Movements: Extraocular movements intact. Pupils: Pupils are equal, round, and reactive to light. Cardiovascular:      Rate and Rhythm: Normal rate and regular rhythm. Heart sounds: Normal heart sounds. Pulmonary:      Effort: No respiratory distress. Breath sounds: No wheezing. Abdominal:      General: Abdomen is flat. Palpations: Abdomen is soft. Tenderness: There is no abdominal tenderness. Musculoskeletal:         General: No swelling or tenderness. Cervical back: Normal range of motion. Skin:     Findings: No erythema or rash. Neurological:      General: No focal deficit present. Mental Status: She is oriented to person, place, and time. Cranial Nerves: No cranial nerve deficit. Sensory: No sensory deficit. Motor: No weakness. Psychiatric:         Mood and Affect: Mood normal.         Behavior: Behavior normal.          Procedures     Procedures    No orders of the defined types were placed in this encounter.        Medications given during this emergency department visit:  Medications - No data to display    New Prescriptions    No medications on file        Past Medical History:   Diagnosis Date    Arthritis     Autoimmune disease ([address])     lupus    DVT (deep venous thrombosis) (HCC)     to R LE    Esophageal stricture     Gastroesophageal reflux disease     Hypertension     Other ill-defined conditions(719.89)     lupus        Past Surgical History:   Procedure Laterality Date    GI      esopheagus stretched x2        Social History     Socioeconomic History    Marital status: Single   Tobacco Use    Smoking status: Never    Smokeless tobacco: Never   Substance and Sexual Activity    Alcohol use: No    Drug use: No        Previous Medications    ALBUTEROL SULFATE  (90 BASE) MCG/ACT INHALER    Inhale 2 puffs into the lungs

## 2023-10-28 NOTE — ED TRIAGE NOTES
Pt ambulatory to triage reporting a home bp check of 202/99. Pt reports a HX of HTN and is taking medications as prescribed. Pt denies headache, burry vision.

## 2023-11-04 ENCOUNTER — HOSPITAL ENCOUNTER (EMERGENCY)
Age: 63
Discharge: HOME | End: 2023-11-04

## 2023-11-04 VITALS
TEMPERATURE: 98.4 F | BODY MASS INDEX: 26.13 KG/M2 | WEIGHT: 142 LBS | HEART RATE: 76 BPM | DIASTOLIC BLOOD PRESSURE: 98 MMHG | OXYGEN SATURATION: 100 % | RESPIRATION RATE: 18 BRPM | HEIGHT: 62 IN | SYSTOLIC BLOOD PRESSURE: 164 MMHG

## 2023-11-04 DIAGNOSIS — K08.89 PAIN, DENTAL: Primary | ICD-10-CM

## 2023-11-04 PROCEDURE — 6370000000 HC RX 637 (ALT 250 FOR IP)

## 2023-11-04 PROCEDURE — 99283 EMERGENCY DEPT VISIT LOW MDM: CPT

## 2023-11-04 RX ORDER — HYDROCODONE BITARTRATE AND ACETAMINOPHEN 5; 325 MG/1; MG/1
1 TABLET ORAL
Status: DISCONTINUED | OUTPATIENT
Start: 2023-11-04 | End: 2023-11-04

## 2023-11-04 RX ORDER — IBUPROFEN 800 MG/1
800 TABLET ORAL 2 TIMES DAILY PRN
Qty: 14 TABLET | Refills: 0 | Status: SHIPPED | OUTPATIENT
Start: 2023-11-04 | End: 2023-11-11

## 2023-11-04 RX ORDER — AMOXICILLIN AND CLAVULANATE POTASSIUM 875; 125 MG/1; MG/1
1 TABLET, FILM COATED ORAL 2 TIMES DAILY
Qty: 14 TABLET | Refills: 0 | Status: SHIPPED | OUTPATIENT
Start: 2023-11-04 | End: 2023-11-11

## 2023-11-04 RX ORDER — IBUPROFEN 800 MG/1
800 TABLET ORAL
Status: COMPLETED | OUTPATIENT
Start: 2023-11-04 | End: 2023-11-04

## 2023-11-04 RX ADMIN — IBUPROFEN 800 MG: 800 TABLET ORAL at 21:48

## 2023-11-04 ASSESSMENT — PAIN - FUNCTIONAL ASSESSMENT: PAIN_FUNCTIONAL_ASSESSMENT: 0-10

## 2023-11-04 ASSESSMENT — PAIN DESCRIPTION - LOCATION
LOCATION: TEETH
LOCATION: TEETH

## 2023-11-04 ASSESSMENT — PAIN DESCRIPTION - DESCRIPTORS: DESCRIPTORS: SORE

## 2023-11-04 ASSESSMENT — PAIN SCALES - GENERAL
PAINLEVEL_OUTOF10: 4
PAINLEVEL_OUTOF10: 7

## 2023-11-04 ASSESSMENT — LIFESTYLE VARIABLES
HOW MANY STANDARD DRINKS CONTAINING ALCOHOL DO YOU HAVE ON A TYPICAL DAY: PATIENT DOES NOT DRINK
HOW OFTEN DO YOU HAVE A DRINK CONTAINING ALCOHOL: NEVER

## 2023-11-04 ASSESSMENT — PAIN DESCRIPTION - ORIENTATION: ORIENTATION: LEFT

## 2023-11-05 NOTE — DISCHARGE INSTRUCTIONS
Please take Augmentin twice daily. Use ibuprofen as needed for pain. Return for worsening symptoms or concerns. Dental Services     The Emergency Department is not able to provide dental services - tooth extractions, root canal. Though we can provide antibiotics for abccess or infection. Listed below are free or low-cost options. THE [address]   [phone]  Tuesday and Thursday- registration starts at 10am. After registering you are given a time to return  Provides free x-rays, extractions, treatment of infection, and dental hygeine. Cannot have medicare, medicaid or other medical insurance coverage  Bring proof of [facility] residency (power bill, for example), Social Security Number and household income documents (including food stamps) as well as any current medications. (**if you do not live in [facility], contact the free clinic in your home county for the availability of dental services)    [address] [place] [address]  [address] [phone]  Monday and Wednesday 8a-5p  Tuesday and Thursday    8a-7p  Friday                               12-5p  Provides Adult and Childrens services. X-rays, extractions, treatment of infection, restorative care  Accepts medicaid, private insurance, self-pay. Fees are on a sliding scale based on income (need to bring documentation)    [facility] [address]     [phone]  Monday - Friday 8am-5p  Accepts medicaid for dental (but not dentures under 21)  Provides xrays, extractions, partials and dentures    [facility] [address] [phone]  Monday- Friday 9a-5p  First Come- First Served  Accepts medicaid, or self pay at or near [facility].   Urgent Care only for xrays, extractions fillings and infections    Mobile [address] [place] [address] -- Call [facility] [phone],

## 2023-11-05 NOTE — ED TRIAGE NOTES
Patient arrives via self from work. Patient states she was eating candy this evening and a piece of her tooth broke off on the lower left side. Patient states she has pain and she feels like the broken tooth is scraping her tongue.

## 2023-11-05 NOTE — ED PROVIDER NOTES
Emergency Department Provider Note       PCP: HILARIO Robb NP   Age: 63 y.o. Sex: female     DISPOSITION Decision To Discharge 11/04/2023 09:01:53 PM       ICD-10-CM    1. Pain, dental  K08.89           Medical Decision Making     Complexity of Problems Addressed:  1 or more acute illnesses that pose a threat to life or bodily function. Data Reviewed and Analyzed:  I independently ordered and reviewed each unique test.  I reviewed external records: ED visit note from an outside group. I reviewed external records: provider visit note from PCP. I reviewed external records: provider visit note from outside specialist.           Discussion of management or test interpretation. See ED course below. Risk of Complications and/or Morbidity of Patient Management:  Prescription drug management performed. Patient was discharged risks and benefits of hospitalization were considered. Shared medical decision making was utilized in creating the patients health plan today. ED Course as of 11/05/23 0024   Sat Nov 04, 2023   246 53-year-old female presents with dental fracture. Has poor dentition with multiple dental caries. Small dental fracture present. Will place patient on a course of Augmentin and have her follow-up with her dentist.  Given dental resources if she is unable to get in touch with her dentist. [CJ]      ED Course User Index  [CJ] HILARIO Mccrary - CNP       History       53-year-old female presents with left lower dental pain. States she was eating a piece of candy when her tooth fractured. This happened several hours prior to arrival.  Denies any fever or chills. States she has some residual pain at the tooth. The history is provided by the patient. Review of Systems   HENT:  Positive for dental problem. All other systems reviewed and are negative.       Physical Exam     Vitals signs and nursing note reviewed:  Vitals:    11/04/23 2050 11/04/23 2152

## 2024-01-06 ENCOUNTER — HOSPITAL ENCOUNTER (EMERGENCY)
Age: 64
Discharge: HOME | End: 2024-01-06

## 2024-01-06 VITALS
HEIGHT: 62 IN | SYSTOLIC BLOOD PRESSURE: 155 MMHG | BODY MASS INDEX: 25.58 KG/M2 | TEMPERATURE: 98.3 F | DIASTOLIC BLOOD PRESSURE: 89 MMHG | OXYGEN SATURATION: 100 % | WEIGHT: 139 LBS | RESPIRATION RATE: 17 BRPM | HEART RATE: 71 BPM

## 2024-01-06 DIAGNOSIS — L29.9 PRURITIC DISORDER: Primary | ICD-10-CM

## 2024-01-06 PROCEDURE — 6370000000 HC RX 637 (ALT 250 FOR IP)

## 2024-01-06 PROCEDURE — 99283 EMERGENCY DEPT VISIT LOW MDM: CPT

## 2024-01-06 RX ORDER — HYDROXYZINE PAMOATE 25 MG/1
25 CAPSULE ORAL 3 TIMES DAILY PRN
Qty: 30 CAPSULE | Refills: 0 | Status: SHIPPED | OUTPATIENT
Start: 2024-01-06 | End: 2024-01-16

## 2024-01-06 RX ORDER — HYDROXYZINE PAMOATE 25 MG/1
25 CAPSULE ORAL
Status: COMPLETED | OUTPATIENT
Start: 2024-01-06 | End: 2024-01-06

## 2024-01-06 RX ADMIN — HYDROXYZINE PAMOATE 25 MG: 25 CAPSULE ORAL at 01:19

## 2024-01-06 ASSESSMENT — PAIN - FUNCTIONAL ASSESSMENT: PAIN_FUNCTIONAL_ASSESSMENT: NONE - DENIES PAIN

## 2024-01-06 NOTE — ED NOTES
I have reviewed discharge instructions with the patient.  The patient verbalized understanding.    Patient left ED via Discharge Method: ambulatory to Home with self.    Opportunity for questions and clarification provided.       Patient given 0 scripts.         To continue your aftercare when you leave the hospital, you may receive an automated call from our care team to check in on how you are doing.  This is a free service and part of our promise to provide the best care and service to meet your aftercare needs.” If you have questions, or wish to unsubscribe from this service please call [phone].  Thank you for Choosing our [facility] Emergency Department.        Neyda Ford LPN  01/06/24 0123

## 2024-01-06 NOTE — ED TRIAGE NOTES
Patient arrives via self from home. Patient states her skin is irritated and itchy. Patient states she has a problem with her hair follicles on her scalp. Patient states it feels like the skin of her scalp is \"crawling\" and it is preventing her from sleeping.

## 2024-01-06 NOTE — ED PROVIDER NOTES
Emergency Department Provider Note                   PCP:                Rosa Frost APRN - NP               Age: 63 y.o.      Sex: female     DISPOSITION Decision To Discharge 01/06/2024 01:02:21 AM       ICD-10-CM    1. Pruritic disorder  L29.9 [facility]          MEDICAL DECISION MAKING  Complexity of Problems Addressed:  Complexity of Problem: 1 acute complicated illness or injury.    Data Reviewed and Analyzed:  Category 1:   I reviewed external records: ED visit note from an outside group.  I reviewed external records: provider visit note from PCP.  I reviewed external records: provider visit note from outside specialist.  I ordered each unique test.  I reviewed the results of each unique test.      Category 3: Discussion of management or test interpretation.    Patient is a 63-year-old female with past medical history of arthritis, hypertension, and GERD presenting with pruritus and skin crawling feeling to her head and neck.    On presentation, patient is afebrile, vital signs are stable, and she is well-appearing in no acute distress.  States this has been ongoing for the past few weeks.  She does have loss of hair on the top of her head but no open wounds, induration, fluctuance, drainage, or evidence of infection.  There is no visible rash.    Patient states it feels like it is \"under her skin\".  She denies any history of drug use.  Physical exam is benign and reassuring.  Will begin patient on Vistaril to begin taking for itching.  She was referred to dermatology by her primary care provider, however she will not be able to be seen for most likely a year.  Will place another referral in for this patient to see if she can get in sooner.  The description of patient's symptoms, however do make me think it could be psychiatric.  Advise follow-up with her primary care provider in the next few days for reevaluation to ensure improvement in her symptoms.  She will return to the ED  rash or other abnormalities in this region.  No visible lice.     Right Ear: Tympanic membrane, ear canal and external ear normal. There is no impacted cerumen.      Left Ear: Tympanic membrane, ear canal and external ear normal. There is no impacted cerumen.      Nose: Nose normal.      Mouth/Throat:      Pharynx: Oropharynx is clear.   Eyes:      Conjunctiva/sclera: Conjunctivae normal.      Pupils: Pupils are equal, round, and reactive to light.   Cardiovascular:      Rate and Rhythm: Normal rate and regular rhythm.   Pulmonary:      Effort: Pulmonary effort is normal. No respiratory distress.   Musculoskeletal:      Cervical back: Normal range of motion. No rigidity.   Skin:     General: Skin is warm.      Findings: No abrasion, abscess, acne, bruising, burn, ecchymosis, erythema, signs of injury, laceration, lesion, petechiae, rash or wound.   Neurological:      Mental Status: She is alert and oriented to person, place, and time.   Psychiatric:         Mood and Affect: Mood normal.         Behavior: Behavior normal.          Procedures     Orders Placed This Encounter   Procedures    [facility]        Medications   hydrOXYzine pamoate (VISTARIL) capsule 25 mg (25 mg Oral Given 1/6/24 0119)       Discharge Medication List as of 1/6/2024  1:17 AM        START taking these medications    Details   hydrOXYzine pamoate (VISTARIL) 25 MG capsule Take 1 capsule by mouth 3 times daily as needed for Itching, Disp-30 capsule, R-0Normal              Past Medical History:   Diagnosis Date    Arthritis     Autoimmune disease (HCC)     lupus    DVT (deep venous thrombosis) (HCC)     to R LE    Esophageal stricture     Gastroesophageal reflux disease     Hypertension     Other ill-defined conditions(799.89)     lupus        Past Surgical History:   Procedure Laterality Date    GI      esopheagus stretched x2        No results found for any visits on 01/06/24.     No orders to display         Voice dictation

## 2024-01-06 NOTE — DISCHARGE INSTRUCTIONS
Please begin taking the hydroxyzine as needed.  It can make you slightly sleepy.  Please follow-up with your primary care provider and dermatology for further evaluation.  Return to the ED with any new or worsening symptoms.

## 2024-04-05 ENCOUNTER — APPOINTMENT (OUTPATIENT)
Dept: GENERAL RADIOLOGY | Age: 64
End: 2024-04-05

## 2024-04-05 ENCOUNTER — HOSPITAL ENCOUNTER (EMERGENCY)
Age: 64
Discharge: HOME | End: 2024-04-05

## 2024-04-05 VITALS
RESPIRATION RATE: 16 BRPM | OXYGEN SATURATION: 99 % | DIASTOLIC BLOOD PRESSURE: 78 MMHG | SYSTOLIC BLOOD PRESSURE: 128 MMHG | BODY MASS INDEX: 25.95 KG/M2 | WEIGHT: 141 LBS | HEART RATE: 60 BPM | TEMPERATURE: 97.9 F | HEIGHT: 62 IN

## 2024-04-05 DIAGNOSIS — M25.551 PAIN OF RIGHT HIP: Primary | ICD-10-CM

## 2024-04-05 DIAGNOSIS — M25.511 ACUTE PAIN OF RIGHT SHOULDER: ICD-10-CM

## 2024-04-05 LAB
EKG ATRIAL RATE: 86 BPM
EKG DIAGNOSIS: NORMAL
EKG P AXIS: 71 DEGREES
EKG P-R INTERVAL: 132 MS
EKG Q-T INTERVAL: 336 MS
EKG QRS DURATION: 76 MS
EKG QTC CALCULATION (BAZETT): 402 MS
EKG R AXIS: 75 DEGREES
EKG T AXIS: 15 DEGREES
EKG VENTRICULAR RATE: 86 BPM

## 2024-04-05 PROCEDURE — 93005 ELECTROCARDIOGRAM TRACING: CPT

## 2024-04-05 PROCEDURE — 93010 ELECTROCARDIOGRAM REPORT: CPT | Performed by: INTERNAL MEDICINE

## 2024-04-05 PROCEDURE — 99284 EMERGENCY DEPT VISIT MOD MDM: CPT

## 2024-04-05 PROCEDURE — 6370000000 HC RX 637 (ALT 250 FOR IP)

## 2024-04-05 PROCEDURE — 73030 X-RAY EXAM OF SHOULDER: CPT

## 2024-04-05 PROCEDURE — 73502 X-RAY EXAM HIP UNI 2-3 VIEWS: CPT

## 2024-04-05 RX ORDER — LIDOCAINE 4 G/G
1 PATCH TOPICAL DAILY
Qty: 30 PATCH | Refills: 0 | Status: SHIPPED | OUTPATIENT
Start: 2024-04-05 | End: 2024-05-05

## 2024-04-05 RX ORDER — LIDOCAINE 4 G/G
1 PATCH TOPICAL
Status: DISCONTINUED | OUTPATIENT
Start: 2024-04-05 | End: 2024-04-05 | Stop reason: HOSPADM

## 2024-04-05 NOTE — ED NOTES
I have reviewed discharge instructions with the patient.  The patient verbalized understanding.    Patient left ED via Discharge Method: ambulatory to Home with self.    Opportunity for questions and clarification provided.       Patient given 1 electronic scripts.         To continue your aftercare when you leave the hospital, you may receive an automated call from our care team to check in on how you are doing.  This is a free service and part of our promise to provide the best care and service to meet your aftercare needs.” If you have questions, or wish to unsubscribe from this service please call [phone].  Thank you for Choosing our [facility] Emergency Department.         Neyda Ford LPN  04/05/24 0157

## 2024-04-05 NOTE — ED PROVIDER NOTES
Emergency Department Provider Note       PCP: Rosa Frost APRN - [place]   Age: 63 y.o.   Sex: female     DISPOSITION Decision To Discharge 04/05/2024 01:19:43 AM       ICD-10-CM    1. Pain of right hip  M25.551 [facility]      2. Acute pain of right shoulder  M25.511 [facility]          Medical Decision Making     71-year-old female presents with right hip pain, tenderness over greater trochanter.  States that she may have come down steps with difficulty creating pain.  She has had no known injury.  Also reports she has had some shoulder pain, this has been chronic in nature.  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  Imaging shows no acute fracture or dislocation.  Given referral to orthopedics.  ED Course as of 04/05/24 0212   Fri Apr 05, 2024   0105 XR shoulder:  IMPRESSION:  Degenerative changes of the acromioclavicular joint of mild nature  with small caudally extending spurs.   [CJ]   0105 XR right hip:  IMPRESSION:  No evidence of right hip fracture   [CJ]      ED Course User Index  [CJ] Gaby Sapp, APRN - CNP     1 or more acute illnesses that pose a threat to life or bodily function.   Over the counter drug management performed.  Prescription drug management performed.  Patient was discharged risks and benefits of hospitalization were considered.  Shared medical decision making was utilized in creating the patients health plan today.    I independently ordered and reviewed each unique test.  I reviewed external records: ED visit note from an outside group.  I reviewed external records: provider visit note from PCP.     I interpreted the X-rays no acute.  My Independent EKG Interpretation: sinus rhythm, no evidence of arrhythmia      ST Segments:Normal ST segments - NO STEMI   Rate: 86            History     71-year-old female with history of DVT, hypothyroidism, lupus, GERD presents for right hip pain.

## 2024-04-05 NOTE — DISCHARGE INSTRUCTIONS
Follow-up with orthopedics.  Use lidocaine patches on 12 hours off 12 hours.  Return for any worsening symptoms or concerns.

## 2024-08-15 ENCOUNTER — HOSPITAL ENCOUNTER (EMERGENCY)
Age: 64
Discharge: HOME | End: 2024-08-15

## 2024-08-15 VITALS
WEIGHT: 141 LBS | HEIGHT: 62 IN | SYSTOLIC BLOOD PRESSURE: 129 MMHG | BODY MASS INDEX: 25.95 KG/M2 | HEART RATE: 63 BPM | TEMPERATURE: 98.1 F | OXYGEN SATURATION: 100 % | DIASTOLIC BLOOD PRESSURE: 72 MMHG | RESPIRATION RATE: 16 BRPM

## 2024-08-15 DIAGNOSIS — L29.9 PRURITIC DISORDER: ICD-10-CM

## 2024-08-15 DIAGNOSIS — H69.93 DYSFUNCTION OF BOTH EUSTACHIAN TUBES: Primary | ICD-10-CM

## 2024-08-15 PROCEDURE — 6360000002 HC RX W HCPCS

## 2024-08-15 PROCEDURE — 99283 EMERGENCY DEPT VISIT LOW MDM: CPT

## 2024-08-15 PROCEDURE — 96372 THER/PROPH/DIAG INJ SC/IM: CPT

## 2024-08-15 RX ORDER — DEXAMETHASONE SODIUM PHOSPHATE 10 MG/ML
8 INJECTION INTRAMUSCULAR; INTRAVENOUS ONCE
Status: COMPLETED | OUTPATIENT
Start: 2024-08-15 | End: 2024-08-15

## 2024-08-15 RX ADMIN — DEXAMETHASONE SODIUM PHOSPHATE 8 MG: 10 INJECTION INTRAMUSCULAR; INTRAVENOUS at 12:03

## 2024-08-15 NOTE — DISCHARGE INSTRUCTIONS
Please continue using your allergy nasal spray at home and taking a daily Zyrtec.  You can begin using the eardrops for your ear irritation.  Continue to closely monitor your symptoms at home.  Please return to the ED immediately if you begin to experience any ear pain, fevers, neck pain or stiffness, or any new or worsening symptoms.

## 2024-08-15 NOTE — ED NOTES
I have reviewed discharge instructions with the patient.  The patient verbalized understanding.    Patient left ED via Discharge Method: ambulatory to Home with self.    Opportunity for questions and clarification provided.       Patient given 0 scripts.         To continue your aftercare when you leave the hospital, you may receive an automated call from our care team to check in on how you are doing.  This is a free service and part of our promise to provide the best care and service to meet your aftercare needs.” If you have questions, or wish to unsubscribe from this service please call [phone].  Thank you for Choosing our [facility] Emergency Department.        Sandra Moulton LPN  08/15/24 6890

## 2024-08-15 NOTE — ED PROVIDER NOTES
Emergency Department Provider Note       PCP: Rosa Frost APRN - NP   Age: 63 y.o.   Sex: female     DISPOSITION Decision To Discharge 08/15/2024 10:29:41 AM  Condition at Disposition: Good       ICD-10-CM    1. Dysfunction of both eustachian tubes  H69.93       2. Pruritic disorder  L29.9           Medical Decision Making     Patient is a well-appearing 63-year-old female presenting with itching and a pressure sensation to bilateral ears as well as some itching to her neck.  Onset of this problem was about a week ago.  Patient states this happens to her every year when her allergies begin to flare.  She states she has been using Debrox and Q-tips without any relief of her symptoms.     On presentation, patient is afebrile, vital signs are stable, and she is well-appearing in no acute distress.  She does have some fluid behind bilateral tympanic membranes without any erythema, bulging, or signs of acute otitis media.  No ear drainage present in the canal, tympanic membranes are intact without perforation.  No tenderness to palpation of the external ear or swelling of the external auditory canal.  No signs of any fungal otitis externa.  She does have some slight irritation of her external auditory canals but no edema present.  There is no rash noted to patient's neck region, not currently pruritic.  Patient has been evaluated at our facility in the past for pruritus with no noticeable rash.     Due to stable vital signs and nontoxic appearance, plan for this patient as continued outpatient management for most likely eustachian tube dysfunction.  Will give her a shot of Decadron in facility to help with her ear pressure and itchiness.  She was instructed to continue her Flonase nasal spray and daily Zyrtec at home.  Will prescribe her some neomycin hydrocortisone eardrops as her canals appeared slightly irritated and the steroid could help with the itching sensation as well.  She was instructed to

## 2024-08-15 NOTE — ED TRIAGE NOTES
Pt c/o ear irritation in both ears and itching on back of neck. Symptoms started a week ago.  Hx seasonal allergies. Denies cough or congestion. Denies pain in ears.

## 2024-09-23 ENCOUNTER — HOSPITAL ENCOUNTER (EMERGENCY)
Age: 64
Discharge: HOME | End: 2024-09-23

## 2024-09-23 VITALS
WEIGHT: 141 LBS | HEIGHT: 62 IN | TEMPERATURE: 98.7 F | HEART RATE: 99 BPM | BODY MASS INDEX: 25.95 KG/M2 | OXYGEN SATURATION: 99 % | RESPIRATION RATE: 18 BRPM | DIASTOLIC BLOOD PRESSURE: 92 MMHG | SYSTOLIC BLOOD PRESSURE: 143 MMHG

## 2024-09-23 DIAGNOSIS — Z71.1 FEARED CONDITION NOT DEMONSTRATED: Primary | ICD-10-CM

## 2024-09-23 PROCEDURE — 99281 EMR DPT VST MAYX REQ PHY/QHP: CPT

## 2024-09-23 ASSESSMENT — PAIN - FUNCTIONAL ASSESSMENT: PAIN_FUNCTIONAL_ASSESSMENT: NONE - DENIES PAIN

## 2025-01-07 ENCOUNTER — HOSPITAL ENCOUNTER (EMERGENCY)
Age: 65
Discharge: HOME | End: 2025-01-07
Attending: EMERGENCY MEDICINE

## 2025-01-07 ENCOUNTER — APPOINTMENT (OUTPATIENT)
Dept: GENERAL RADIOLOGY | Age: 65
End: 2025-01-07

## 2025-01-07 VITALS
SYSTOLIC BLOOD PRESSURE: 140 MMHG | HEIGHT: 62 IN | OXYGEN SATURATION: 98 % | HEART RATE: 86 BPM | BODY MASS INDEX: 26.31 KG/M2 | TEMPERATURE: 98.2 F | DIASTOLIC BLOOD PRESSURE: 81 MMHG | RESPIRATION RATE: 18 BRPM | WEIGHT: 143 LBS

## 2025-01-07 DIAGNOSIS — U07.1 COVID-19: Primary | ICD-10-CM

## 2025-01-07 LAB
FLUAV RNA SPEC QL NAA+PROBE: NOT DETECTED
FLUBV RNA SPEC QL NAA+PROBE: NOT DETECTED
SARS-COV-2 RDRP RESP QL NAA+PROBE: DETECTED
SOURCE: ABNORMAL

## 2025-01-07 PROCEDURE — 71046 X-RAY EXAM CHEST 2 VIEWS: CPT

## 2025-01-07 PROCEDURE — 87502 INFLUENZA DNA AMP PROBE: CPT

## 2025-01-07 PROCEDURE — 87635 SARS-COV-2 COVID-19 AMP PRB: CPT

## 2025-01-07 PROCEDURE — 99284 EMERGENCY DEPT VISIT MOD MDM: CPT

## 2025-01-07 RX ORDER — BENZONATATE 200 MG/1
200 CAPSULE ORAL 3 TIMES DAILY PRN
Qty: 15 CAPSULE | Refills: 0 | Status: SHIPPED | OUTPATIENT
Start: 2025-01-07 | End: 2025-01-12

## 2025-01-07 ASSESSMENT — PAIN - FUNCTIONAL ASSESSMENT: PAIN_FUNCTIONAL_ASSESSMENT: NONE - DENIES PAIN

## 2025-01-07 NOTE — DISCHARGE INSTRUCTIONS
Rest.  Plenty of fluids.  Tylenol or ibuprofen for any achiness or fever.  Cough medication if needed.  May also try over-the-counter decongestant.  Because you have to take the blood thinner, antiviral medications would not be a good idea due to complications.  No work for at least 5 days.  May return to work if no fever.  Recheck for shortness of breath, personally a high fevers or other concerns

## 2025-01-07 NOTE — ED NOTES
Patient mobility status  with no difficulty.     I have reviewed discharge instructions with the patient.  The patient verbalized understanding.    Patient left ED via Discharge Method: ambulatory to Home with  self .    Opportunity for questions and clarification provided.     Patient given 1 scripts.            Jesus Nguyen RN  01/07/25 1112

## 2025-01-07 NOTE — ED TRIAGE NOTES
Pt reports congestion since last night with cough.  Pt denies respiratory hx other than seasonal allergies.

## 2025-01-07 NOTE — ED PROVIDER NOTES
Emergency Department Provider Note       PCP: Unknown, Provider, ANP   Age: 64 y.o.   Sex: female     DISPOSITION Decision To Discharge 01/07/2025 10:19:19 AM    ICD-10-CM    1. COVID-19  U07.1           Medical Decision Making   Vitals reasonable.  Will check chest x-ray.  Swab for viral infections.  Do not believe blood work indicated unless there is an abnormality on the chest x-ray that needs addressing.  Probable viral syndrome.  Rule out pneumonia given history of intermittent nose suppression from lupus  Chest x-ray negative.  COVID-positive.  On Xarelto thus contraindicated for Paxlovid.  Patient's symptoms are mild.  Symptomatic treatment     1 or more acute illnesses that pose a threat to life or bodily function.   Prescription drug management performed.  Chronic medical problems impacting care include history of thromboembolism and chronic anticoagulant use.  I independently ordered and reviewed each unique test.    I reviewed external records: provider visit note from PCP.   Primary care doctor notes regarding history of DVT.  Also lupus history.  Along with hypertension    I interpreted the X-rays chest x-ray is without infiltrate.      History     64-year-old female comes in by private vehicle.  I reviewed patient's old records.  History of DVT.  Patient still takes Xarelto.  Also history of lupus.  Patient not on prednisone or any medications at this point.  Has had it for many years.  Patient started up with slight chills congestion and cough last night.  No particular chest pain or shortness of breath.  No definite fever.  No vomiting or diarrhea.  No dysuria.  Exposures to other people that have been sick.        Physical Exam     Vitals signs and nursing note reviewed:  Vitals:    01/07/25 0930 01/07/25 0932   BP: 133/83    Pulse: 87    Resp: 16    Temp: 98.2 °F (36.8 °C)    TempSrc: Oral    SpO2: 96%    Weight:  64.9 kg (143 lb)   Height:  1.575 m (5' 2\")      Physical Exam  Vitals and nursing

## 2025-01-08 NOTE — ED NOTES
Attempt made by this RN to contact pt on 1/8 for follow up call post discharge from the ED. Unable to reach pt.      Andressa Borjas, RN  01/08/25 2511

## 2025-01-13 ENCOUNTER — HOSPITAL ENCOUNTER (EMERGENCY)
Age: 65
Discharge: HOME | End: 2025-01-13

## 2025-01-13 VITALS
SYSTOLIC BLOOD PRESSURE: 159 MMHG | WEIGHT: 143 LBS | BODY MASS INDEX: 26.31 KG/M2 | OXYGEN SATURATION: 99 % | RESPIRATION RATE: 17 BRPM | DIASTOLIC BLOOD PRESSURE: 87 MMHG | HEART RATE: 99 BPM | HEIGHT: 62 IN | TEMPERATURE: 99 F

## 2025-01-13 DIAGNOSIS — U07.1 COVID: Primary | ICD-10-CM

## 2025-01-13 LAB
SARS-COV-2 RDRP RESP QL NAA+PROBE: DETECTED
SOURCE: ABNORMAL

## 2025-01-13 PROCEDURE — 99283 EMERGENCY DEPT VISIT LOW MDM: CPT

## 2025-01-13 PROCEDURE — 87635 SARS-COV-2 COVID-19 AMP PRB: CPT

## 2025-01-13 ASSESSMENT — PAIN - FUNCTIONAL ASSESSMENT: PAIN_FUNCTIONAL_ASSESSMENT: NONE - DENIES PAIN

## 2025-01-13 ASSESSMENT — ENCOUNTER SYMPTOMS
CHEST TIGHTNESS: 0
COUGH: 1
SHORTNESS OF BREATH: 0

## 2025-01-13 NOTE — ED TRIAGE NOTES
Patient was here last week last week and diagnosed with Covid, is here for another test so that she can go back to work (work states she needs a negative result to go back).

## 2025-01-13 NOTE — ED PROVIDER NOTES
Emergency Department Provider Note       PCP: Unknown, Provider, ANP   Age: 64 y.o.   Sex: female     DISPOSITION Decision To Discharge 01/13/2025 05:51:10 PM    ICD-10-CM    1. COVID  U07.1           Medical Decision Making     62-year-old female presents requesting COVID test.  Tested positive for COVID 5 days ago would like a negative test to return to work.  Unfortunately COVID today as today is positive.  Instructed patient to obtain over-the-counter COVID test and return for any other concerns     1 or more acute illnesses that pose a threat to life or bodily function.   Patient was discharged risks and benefits of hospitalization were considered.  Shared medical decision making was utilized in creating the patients health plan today.  I independently ordered and reviewed each unique test.    I reviewed external records: ED visit note from a different ED.                      History     64-year-old female presents requesting COVID test.  She states that she tested positive for COVID 5 days ago and would like to return to work.  She contacted her employer and they requested that she have a negative COVID test before returning to work.  She states she is feeling much better overall.    The history is provided by the patient.       ROS     Review of Systems   Constitutional:  Negative for chills, fatigue and fever.   Respiratory:  Positive for cough. Negative for chest tightness and shortness of breath.    All other systems reviewed and are negative.       Physical Exam     Vitals signs and nursing note reviewed:  Vitals:    01/13/25 1655   BP: (!) 157/89   Pulse: 77   Resp: 18   Temp: 99 °F (37.2 °C)   TempSrc: Oral   SpO2: 100%   Weight: 64.9 kg (143 lb)   Height: 1.575 m (5' 2\")      Physical Exam  Vitals and nursing note reviewed.   Constitutional:       Appearance: Normal appearance.   Cardiovascular:      Rate and Rhythm: Normal rate and regular rhythm.      Pulses: Normal pulses.      Heart sounds:

## 2025-01-13 NOTE — DISCHARGE INSTRUCTIONS
Your COVID test in the emergency department today remained positive.  You can buy over-the-counter COVID test at the drugstore.  You may continue to test positive for several days.

## 2025-06-10 ENCOUNTER — HOSPITAL ENCOUNTER (OUTPATIENT)
Dept: LAB | Age: 65
Setting detail: SPECIMEN
Discharge: HOME | End: 2025-06-12
Payer: COMMERCIAL

## 2025-06-10 PROCEDURE — 88142 CYTOPATH C/V THIN LAYER: CPT

## 2025-06-19 ENCOUNTER — HOSPITAL ENCOUNTER (EMERGENCY)
Age: 65
Discharge: HOME | End: 2025-06-19
Attending: EMERGENCY MEDICINE
Payer: COMMERCIAL

## 2025-06-19 VITALS
HEIGHT: 62 IN | RESPIRATION RATE: 18 BRPM | DIASTOLIC BLOOD PRESSURE: 78 MMHG | WEIGHT: 145 LBS | TEMPERATURE: 98.6 F | SYSTOLIC BLOOD PRESSURE: 123 MMHG | HEART RATE: 87 BPM | BODY MASS INDEX: 26.68 KG/M2 | OXYGEN SATURATION: 99 %

## 2025-06-19 DIAGNOSIS — T50.Z95A: Primary | ICD-10-CM

## 2025-06-19 PROCEDURE — 99282 EMERGENCY DEPT VISIT SF MDM: CPT

## 2025-06-19 ASSESSMENT — PAIN - FUNCTIONAL ASSESSMENT: PAIN_FUNCTIONAL_ASSESSMENT: 0-10

## 2025-06-19 ASSESSMENT — PAIN DESCRIPTION - LOCATION: LOCATION: JAW

## 2025-06-19 ASSESSMENT — PAIN SCALES - GENERAL: PAINLEVEL_OUTOF10: 6

## 2025-06-20 NOTE — ED PROVIDER NOTES
Emergency Department Provider Note       [facility] EMERGENCY DEPT   PCP: Unknown, Provider   Age: 64 y.o.   Sex: female     DISPOSITION Decision To Discharge 06/19/2025 11:35:24 PM    ICD-10-CM    1. Vaccines and biological substances causing adverse effect in therapeutic use, initial encounter  [id number].Z95A           Medical Decision Making     Currently asymptomatic.  Well-appearing.  Discussed likely side effects of vaccine.  Afebrile.  No meningeal signs.     1 acute, uncomplicated illness or injury.  Shared medical decision making was utilized in creating the patients health plan today.  I independently ordered and reviewed each unique test.                         History     64-year-old female presents with her head feeling hot, right jaw discomfort, and a low-grade temp around 99 at home this evening.  She received her pneumococcal and RSV vaccine 2 days ago and her shingles vaccine today.  She states all symptoms have now resolved.  No more jaw pain, she never had jaw swelling.  She took Tylenol around 8 PM.  No rash.  No dental pain.        Physical Exam     Vitals signs and nursing note reviewed:  Vitals:    06/19/25 2213   BP: 121/77   Pulse: (!) 106   Resp: 18   Temp: 98.6 °F (37 °C)   SpO2: 100%   Weight: 65.8 kg (145 lb)   Height: 1.575 m (5' 2\")      Physical Exam  Vitals and nursing note reviewed.   Constitutional:       Appearance: Normal appearance. She is well-developed.   HENT:      Head: Normocephalic and atraumatic.      Nose: Nose normal.      Mouth/Throat:      Mouth: Mucous membranes are moist. No oral lesions.      Dentition: No dental tenderness, gingival swelling or dental abscesses.      Pharynx: Oropharynx is clear. No uvula swelling.      Comments: Multiple fillings, no dental tenderness or swelling  Eyes:      Extraocular Movements: Extraocular movements intact.      Pupils: Pupils are equal, round, and reactive to light.   Neck:      Trachea: Trachea normal.      Meningeal: Brudzinski's

## 2025-06-20 NOTE — ED TRIAGE NOTES
Pt ambulatory into the ED with fever and jaw pain. Pt had a shingles shot earlier today. Pt had a fever earlier at home and took some tylenol around 8pm.

## 2025-06-21 ENCOUNTER — HOSPITAL ENCOUNTER (EMERGENCY)
Age: 65
Discharge: HOME | End: 2025-06-22
Attending: EMERGENCY MEDICINE
Payer: COMMERCIAL

## 2025-06-21 ENCOUNTER — APPOINTMENT (OUTPATIENT)
Dept: GENERAL RADIOLOGY | Age: 65
End: 2025-06-21
Payer: COMMERCIAL

## 2025-06-21 DIAGNOSIS — R07.89 ATYPICAL CHEST PAIN: Primary | ICD-10-CM

## 2025-06-21 LAB
ALBUMIN SERPL-MCNC: 3.3 G/DL (ref 3.2–4.6)
ALBUMIN/GLOB SERPL: 0.7 (ref 1–1.9)
ALP SERPL-CCNC: 59 U/L (ref 35–104)
ALT SERPL-CCNC: 35 U/L (ref 8–45)
ANION GAP SERPL CALC-SCNC: 12 MMOL/L (ref 7–16)
AST SERPL-CCNC: 54 U/L (ref 15–37)
BASOPHILS # BLD: 0.04 K/UL (ref 0–0.2)
BASOPHILS NFR BLD: 1 % (ref 0–2)
BILIRUB SERPL-MCNC: 0.4 MG/DL (ref 0–1.2)
BUN SERPL-MCNC: 16 MG/DL (ref 8–23)
CALCIUM SERPL-MCNC: 9.1 MG/DL (ref 8.8–10.2)
CHLORIDE SERPL-SCNC: 105 MMOL/L (ref 98–107)
CO2 SERPL-SCNC: 20 MMOL/L (ref 20–29)
CREAT SERPL-MCNC: 1.13 MG/DL (ref 0.6–1.1)
DIFFERENTIAL METHOD BLD: ABNORMAL
EOSINOPHIL # BLD: 0.07 K/UL (ref 0–0.8)
EOSINOPHIL NFR BLD: 1.7 % (ref 0.5–7.8)
ERYTHROCYTE [DISTWIDTH] IN BLOOD BY AUTOMATED COUNT: 13.1 % (ref 11.9–14.6)
GLOBULIN SER CALC-MCNC: 4.6 G/DL (ref 2.3–3.5)
GLUCOSE SERPL-MCNC: 90 MG/DL (ref 70–99)
HCT VFR BLD AUTO: 36.5 % (ref 35.8–46.3)
HGB BLD-MCNC: 12.4 G/DL (ref 11.7–15.4)
IMM GRANULOCYTES # BLD AUTO: 0.03 K/UL (ref 0–0.5)
IMM GRANULOCYTES NFR BLD AUTO: 0.7 % (ref 0–5)
LYMPHOCYTES # BLD: 1.3 K/UL (ref 0.5–4.6)
LYMPHOCYTES NFR BLD: 31.9 % (ref 13–44)
MCH RBC QN AUTO: 31.1 PG (ref 26.1–32.9)
MCHC RBC AUTO-ENTMCNC: 34 G/DL (ref 31.4–35)
MCV RBC AUTO: 91.5 FL (ref 82–102)
MONOCYTES # BLD: 0.66 K/UL (ref 0.1–1.3)
MONOCYTES NFR BLD: 16.2 % (ref 4–12)
NEUTS SEG # BLD: 1.98 K/UL (ref 1.7–8.2)
NEUTS SEG NFR BLD: 48.5 % (ref 43–78)
NRBC # BLD: 0 K/UL (ref 0–0.2)
PLATELET # BLD AUTO: 190 K/UL (ref 150–450)
PMV BLD AUTO: 10.4 FL (ref 9.4–12.3)
POTASSIUM SERPL-SCNC: ABNORMAL MMOL/L (ref 3.5–5.1)
PROT SERPL-MCNC: 7.9 G/DL (ref 6.3–8.2)
RBC # BLD AUTO: 3.99 M/UL (ref 4.05–5.2)
SODIUM SERPL-SCNC: 137 MMOL/L (ref 136–145)
TROPONIN T SERPL HS-MCNC: <6 NG/L (ref 0–14)
TROPONIN T SERPL HS-MCNC: <6 NG/L (ref 0–14)
WBC # BLD AUTO: 4.1 K/UL (ref 4.3–11.1)

## 2025-06-21 PROCEDURE — 96374 THER/PROPH/DIAG INJ IV PUSH: CPT

## 2025-06-21 PROCEDURE — 84484 ASSAY OF TROPONIN QUANT: CPT

## 2025-06-21 PROCEDURE — 99285 EMERGENCY DEPT VISIT HI MDM: CPT

## 2025-06-21 PROCEDURE — 85025 COMPLETE CBC W/AUTO DIFF WBC: CPT

## 2025-06-21 PROCEDURE — 71046 X-RAY EXAM CHEST 2 VIEWS: CPT

## 2025-06-21 PROCEDURE — 80053 COMPREHEN METABOLIC PANEL: CPT

## 2025-06-21 PROCEDURE — 6360000002 HC RX W HCPCS: Performed by: EMERGENCY MEDICINE

## 2025-06-21 PROCEDURE — 93005 ELECTROCARDIOGRAM TRACING: CPT | Performed by: EMERGENCY MEDICINE

## 2025-06-21 RX ORDER — KETOROLAC TROMETHAMINE 30 MG/ML
30 INJECTION, SOLUTION INTRAMUSCULAR; INTRAVENOUS
Status: COMPLETED | OUTPATIENT
Start: 2025-06-21 | End: 2025-06-21

## 2025-06-21 RX ADMIN — KETOROLAC TROMETHAMINE 30 MG: 30 INJECTION, SOLUTION INTRAMUSCULAR at 19:58

## 2025-06-21 ASSESSMENT — PAIN DESCRIPTION - PAIN TYPE: TYPE: ACUTE PAIN

## 2025-06-21 ASSESSMENT — PAIN SCALES - GENERAL
PAINLEVEL_OUTOF10: 6
PAINLEVEL_OUTOF10: 6
PAINLEVEL_OUTOF10: 3

## 2025-06-21 ASSESSMENT — PAIN DESCRIPTION - LOCATION
LOCATION: CHEST
LOCATION: CHEST;BACK
LOCATION: CHEST

## 2025-06-21 ASSESSMENT — ENCOUNTER SYMPTOMS
DIARRHEA: 0
RHINORRHEA: 0
VOMITING: 0
ABDOMINAL PAIN: 0
SHORTNESS OF BREATH: 0
BACK PAIN: 1
SORE THROAT: 0
COLOR CHANGE: 0
CONSTIPATION: 0
COUGH: 0
NAUSEA: 0

## 2025-06-21 ASSESSMENT — PAIN - FUNCTIONAL ASSESSMENT: PAIN_FUNCTIONAL_ASSESSMENT: 0-10

## 2025-06-21 ASSESSMENT — PAIN DESCRIPTION - ORIENTATION
ORIENTATION: MID
ORIENTATION: MID

## 2025-06-21 ASSESSMENT — PAIN DESCRIPTION - DESCRIPTORS
DESCRIPTORS: SORE

## 2025-06-21 NOTE — ED PROVIDER NOTES
Emergency Department Provider Note       [facility] EMERGENCY DEPT   PCP: Unknown, Provider   Age: 64 y.o.   Sex: female     DISPOSITION Decision To Discharge 06/21/2025 11:24:05 PM    ICD-10-CM    1. Atypical chest pain  R07.89           Medical Decision Making     Patient with chest soreness.  Feels better after medication here.  No EKG changes and 2 negative troponins.  Will discharge with PCP follow-up     1 or more acute illnesses that pose a threat to life or bodily function.   Prescription drug management performed.  Patient was discharged risks and benefits of hospitalization were considered.  Shared medical decision making was utilized in creating the patients health plan today.  I independently ordered and reviewed each unique test.       The patients assessment required an independent historian: ems.  The reason they were needed is important historical information not provided by the patient.  ED cardiac monitoring rhythm strip was ordered and interpreted:  sinus rhythm, no evidence of an arrhythmia  ST Segments:Nonspecific ST segments - NO STEMI   Rate: 68  I interpreted the X-rays no infiltrate.  ED provider's independent EKG interpretation normal sinus rhythm rate 84.  Nonspecific ST changes.            History     Patient having some soreness in her chest and upper back yesterday and today.  No shortness of breath nausea numbness or diaphoresis.  With some continued today came here for evaluation.  Feeling some better here.  Had 2 recent shots in her arms.  1 for the shingles and 1 for pneumonia.    The history is provided by the patient. No  was used.   Chest Pain  Chest pain location: sternal.  Pain quality: aching (sore)    Pain radiates to:  Does not radiate  Pain severity:  Mild  Duration:  2 days  Timing:  Constant  Progression:  Unchanged  Chronicity:  New  Relieved by:  Nothing  Worsened by:  Nothing  Associated symptoms: back pain (sore)    Associated symptoms: no abdominal

## 2025-06-22 VITALS
SYSTOLIC BLOOD PRESSURE: 117 MMHG | WEIGHT: 145 LBS | BODY MASS INDEX: 26.68 KG/M2 | OXYGEN SATURATION: 99 % | HEIGHT: 62 IN | TEMPERATURE: 98.7 F | DIASTOLIC BLOOD PRESSURE: 68 MMHG | HEART RATE: 65 BPM | RESPIRATION RATE: 16 BRPM

## 2025-06-22 LAB
EKG ATRIAL RATE: 84 BPM
EKG DIAGNOSIS: NORMAL
EKG P AXIS: 66 DEGREES
EKG P-R INTERVAL: 126 MS
EKG Q-T INTERVAL: 350 MS
EKG QRS DURATION: 74 MS
EKG QTC CALCULATION (BAZETT): 413 MS
EKG R AXIS: 75 DEGREES
EKG T AXIS: 30 DEGREES
EKG VENTRICULAR RATE: 84 BPM

## 2025-06-22 PROCEDURE — 93010 ELECTROCARDIOGRAM REPORT: CPT | Performed by: INTERNAL MEDICINE

## 2025-06-22 ASSESSMENT — PAIN - FUNCTIONAL ASSESSMENT: PAIN_FUNCTIONAL_ASSESSMENT: NONE - DENIES PAIN

## 2025-06-22 ASSESSMENT — PAIN SCALES - GENERAL: PAINLEVEL_OUTOF10: 0

## 2025-06-25 LAB
HPV HR 12 DNA CVX QL NAA+PROBE: NEGATIVE
HPV16 DNA CVX QL NAA+PROBE: NEGATIVE
HPV18 DNA CVX QL NAA+PROBE: NEGATIVE
SPECIMEN SOURCE: NORMAL